# Patient Record
Sex: MALE | Race: WHITE | Employment: FULL TIME | RURAL
[De-identification: names, ages, dates, MRNs, and addresses within clinical notes are randomized per-mention and may not be internally consistent; named-entity substitution may affect disease eponyms.]

---

## 2018-08-03 DIAGNOSIS — R11.2 NAUSEA AND VOMITING, INTRACTABILITY OF VOMITING NOT SPECIFIED, UNSPECIFIED VOMITING TYPE: Primary | ICD-10-CM

## 2018-09-10 ENCOUNTER — HOSPITAL ENCOUNTER (OUTPATIENT)
Facility: HOSPITAL | Age: 23
Discharge: HOME | End: 2018-09-10
Payer: COMMERCIAL

## 2018-09-10 DIAGNOSIS — R11.2 NAUSEA AND VOMITING, INTRACTABILITY OF VOMITING NOT SPECIFIED, UNSPECIFIED VOMITING TYPE: ICD-10-CM

## 2018-09-10 PROCEDURE — 83013 H PYLORI (C-13) BREATH: CPT

## 2018-09-11 LAB — H PYLORI BREATH TEST: NEGATIVE

## 2018-09-12 ENCOUNTER — TELEPHONE (OUTPATIENT)
Dept: PRIMARY CARE CLINIC | Age: 23
End: 2018-09-12

## 2019-03-01 ENCOUNTER — OFFICE VISIT (OUTPATIENT)
Dept: PRIMARY CARE CLINIC | Age: 24
End: 2019-03-01
Payer: COMMERCIAL

## 2019-03-01 VITALS
DIASTOLIC BLOOD PRESSURE: 72 MMHG | SYSTOLIC BLOOD PRESSURE: 130 MMHG | BODY MASS INDEX: 39.68 KG/M2 | HEART RATE: 70 BPM | TEMPERATURE: 98.2 F | RESPIRATION RATE: 16 BRPM | OXYGEN SATURATION: 99 % | HEIGHT: 71 IN | WEIGHT: 283.4 LBS

## 2019-03-01 DIAGNOSIS — R11.0 NAUSEA: ICD-10-CM

## 2019-03-01 DIAGNOSIS — J06.9 UPPER RESPIRATORY TRACT INFECTION, UNSPECIFIED TYPE: Primary | ICD-10-CM

## 2019-03-01 PROCEDURE — 99213 OFFICE O/P EST LOW 20 MIN: CPT | Performed by: NURSE PRACTITIONER

## 2019-03-01 RX ORDER — PREDNISONE 10 MG/1
10 TABLET ORAL 2 TIMES DAILY
Qty: 10 TABLET | Refills: 0 | Status: SHIPPED | OUTPATIENT
Start: 2019-03-01 | End: 2019-03-06

## 2019-03-01 RX ORDER — AZITHROMYCIN 250 MG/1
250 TABLET, FILM COATED ORAL SEE ADMIN INSTRUCTIONS
Qty: 6 TABLET | Refills: 0 | Status: SHIPPED | OUTPATIENT
Start: 2019-03-01 | End: 2019-03-06

## 2019-03-01 RX ORDER — ONDANSETRON 4 MG/1
4 TABLET, FILM COATED ORAL 3 TIMES DAILY PRN
Qty: 15 TABLET | Refills: 0 | Status: SHIPPED | OUTPATIENT
Start: 2019-03-01 | End: 2019-05-10

## 2019-03-01 ASSESSMENT — PATIENT HEALTH QUESTIONNAIRE - PHQ9
SUM OF ALL RESPONSES TO PHQ9 QUESTIONS 1 & 2: 0
SUM OF ALL RESPONSES TO PHQ QUESTIONS 1-9: 0
2. FEELING DOWN, DEPRESSED OR HOPELESS: 0
SUM OF ALL RESPONSES TO PHQ QUESTIONS 1-9: 0
1. LITTLE INTEREST OR PLEASURE IN DOING THINGS: 0

## 2019-03-04 ASSESSMENT — ENCOUNTER SYMPTOMS
CHEST TIGHTNESS: 0
SINUS PRESSURE: 1
DIARRHEA: 1
BACK PAIN: 0
ABDOMINAL PAIN: 0
NAUSEA: 1
SHORTNESS OF BREATH: 0

## 2019-05-10 ENCOUNTER — HOSPITAL ENCOUNTER (OUTPATIENT)
Facility: HOSPITAL | Age: 24
Discharge: HOME | End: 2019-05-10
Payer: COMMERCIAL

## 2019-05-10 ENCOUNTER — OFFICE VISIT (OUTPATIENT)
Dept: PRIMARY CARE CLINIC | Age: 24
End: 2019-05-10
Payer: COMMERCIAL

## 2019-05-10 VITALS
SYSTOLIC BLOOD PRESSURE: 120 MMHG | OXYGEN SATURATION: 97 % | RESPIRATION RATE: 18 BRPM | BODY MASS INDEX: 39 KG/M2 | HEART RATE: 57 BPM | DIASTOLIC BLOOD PRESSURE: 80 MMHG | HEIGHT: 71 IN | TEMPERATURE: 98.9 F | WEIGHT: 278.6 LBS

## 2019-05-10 DIAGNOSIS — R53.83 FATIGUE, UNSPECIFIED TYPE: ICD-10-CM

## 2019-05-10 DIAGNOSIS — R68.82 LOW LIBIDO: ICD-10-CM

## 2019-05-10 DIAGNOSIS — R68.82 LOW LIBIDO: Primary | ICD-10-CM

## 2019-05-10 LAB
A/G RATIO: 2.3 (ref 0.8–2)
ALBUMIN SERPL-MCNC: 4.9 G/DL (ref 3.4–4.8)
ALP BLD-CCNC: 60 U/L (ref 25–100)
ALT SERPL-CCNC: 22 U/L (ref 4–36)
ANION GAP SERPL CALCULATED.3IONS-SCNC: 10 MMOL/L (ref 3–16)
AST SERPL-CCNC: 25 U/L (ref 8–33)
BILIRUB SERPL-MCNC: 0.9 MG/DL (ref 0.3–1.2)
BUN BLDV-MCNC: 15 MG/DL (ref 6–20)
CALCIUM SERPL-MCNC: 9.5 MG/DL (ref 8.5–10.5)
CHLORIDE BLD-SCNC: 103 MMOL/L (ref 98–107)
CHOLESTEROL, TOTAL: 124 MG/DL (ref 0–200)
CO2: 25 MMOL/L (ref 20–30)
CREAT SERPL-MCNC: 0.9 MG/DL (ref 0.4–1.2)
FOLATE: >20 NG/ML
GFR AFRICAN AMERICAN: >59
GFR NON-AFRICAN AMERICAN: >59
GLOBULIN: 2.1 G/DL
GLUCOSE BLD-MCNC: 85 MG/DL (ref 74–106)
HCT VFR BLD CALC: 43.8 % (ref 40–54)
HDLC SERPL-MCNC: 34 MG/DL (ref 40–60)
HEMOGLOBIN: 15.6 G/DL (ref 13–18)
LDL CHOLESTEROL CALCULATED: 76 MG/DL
MCH RBC QN AUTO: 31.8 PG (ref 27–32)
MCHC RBC AUTO-ENTMCNC: 35.6 G/DL (ref 31–35)
MCV RBC AUTO: 89.4 FL (ref 80–100)
PDW BLD-RTO: 11.7 % (ref 11–16)
PLATELET # BLD: 205 K/UL (ref 150–400)
PMV BLD AUTO: 10.6 FL (ref 6–10)
POTASSIUM SERPL-SCNC: 4.5 MMOL/L (ref 3.4–5.1)
RBC # BLD: 4.9 M/UL (ref 4.5–6)
SODIUM BLD-SCNC: 138 MMOL/L (ref 136–145)
TOTAL PROTEIN: 7 G/DL (ref 6.4–8.3)
TRIGL SERPL-MCNC: 72 MG/DL (ref 0–249)
TSH SERPL DL<=0.05 MIU/L-ACNC: 1.01 UIU/ML (ref 0.35–5.5)
VITAMIN B-12: 325 PG/ML (ref 211–911)
VITAMIN D 25-HYDROXY: 24.5 (ref 32–100)
VLDLC SERPL CALC-MCNC: 14 MG/DL
WBC # BLD: 4.9 K/UL (ref 4–11)

## 2019-05-10 PROCEDURE — 84403 ASSAY OF TOTAL TESTOSTERONE: CPT

## 2019-05-10 PROCEDURE — 84443 ASSAY THYROID STIM HORMONE: CPT

## 2019-05-10 PROCEDURE — 82607 VITAMIN B-12: CPT

## 2019-05-10 PROCEDURE — 82746 ASSAY OF FOLIC ACID SERUM: CPT

## 2019-05-10 PROCEDURE — 80053 COMPREHEN METABOLIC PANEL: CPT

## 2019-05-10 PROCEDURE — 85027 COMPLETE CBC AUTOMATED: CPT

## 2019-05-10 PROCEDURE — 36415 COLL VENOUS BLD VENIPUNCTURE: CPT

## 2019-05-10 PROCEDURE — 80061 LIPID PANEL: CPT

## 2019-05-10 PROCEDURE — 84270 ASSAY OF SEX HORMONE GLOBUL: CPT

## 2019-05-10 PROCEDURE — 82306 VITAMIN D 25 HYDROXY: CPT

## 2019-05-10 PROCEDURE — 99213 OFFICE O/P EST LOW 20 MIN: CPT | Performed by: NURSE PRACTITIONER

## 2019-05-10 ASSESSMENT — ENCOUNTER SYMPTOMS
RESPIRATORY NEGATIVE: 1
EYES NEGATIVE: 1
GASTROINTESTINAL NEGATIVE: 1

## 2019-05-10 NOTE — PROGRESS NOTES
exhibits no edema or tenderness. Neurological: He is alert and oriented to person, place, and time. Skin: Skin is warm and dry. No rash noted. Psychiatric: He has a normal mood and affect. Judgment normal.   Nursing note and vitals reviewed. Results in Past 30 Days  Result Component Current Result Ref Range Previous Result Ref Range   Alb 4.9 (H) (5/10/2019) 3.4 - 4.8 g/dL Not in Time Range    Albumin/Globulin Ratio 2.3 (H) (5/10/2019) 0.8 - 2.0 Not in Time Range    Alkaline Phosphatase 60 (5/10/2019) 25 - 100 U/L Not in Time Range    ALT 22 (5/10/2019) 4 - 36 U/L Not in Time Range    AST 25 (5/10/2019) 8 - 33 U/L Not in Time Range    BUN 15 (5/10/2019) 6 - 20 mg/dL Not in Time Range    Calcium 9.5 (5/10/2019) 8.5 - 10.5 mg/dL Not in Time Range    Chloride 103 (5/10/2019) 98 - 107 mmol/L Not in Time Range    CO2 25 (5/10/2019) 20 - 30 mmol/L Not in Time Range    CREATININE 0.9 (5/10/2019) 0.4 - 1.2 mg/dL Not in Time Range    GFR  >59 (5/10/2019) >59 Not in Time Range    GFR Non- >59 (5/10/2019) >59 Not in Time Range    Globulin 2.1 (5/10/2019) g/dL Not in Time Range    Glucose 85 (5/10/2019) 74 - 106 mg/dL Not in Time Range    Potassium 4.5 (5/10/2019) 3.4 - 5.1 mmol/L Not in Time Range    Sodium 138 (5/10/2019) 136 - 145 mmol/L Not in Time Range    Total Bilirubin 0.9 (5/10/2019) 0.3 - 1.2 mg/dL Not in Time Range    Total Protein 7.0 (5/10/2019) 6.4 - 8.3 g/dL Not in Time Range        LDL Calculated (mg/dL)   Date Value   05/10/2019 76         Lab Results   Component Value Date    WBC 4.9 05/10/2019    HGB 15.6 05/10/2019    HCT 43.8 05/10/2019    MCV 89.4 05/10/2019     05/10/2019       Lab Results   Component Value Date    TSH 1.01 05/10/2019         ASSESSMENT/PLAN:     Min Diaz was seen today for check-up. Diagnoses and all orders for this visit:    Low libido  -     Comprehensive Metabolic Panel; Future  -     CBC; Future  -     TSH without Reflex;  Future  - Testosterone, free, total; Future    Fatigue, unspecified type  -     Lipid Panel; Future  -     TSH without Reflex; Future  -     Vitamin B12 & Folate; Future  -     Vitamin D 25 Hydroxy;  Future      Medications Discontinued During This Encounter   Medication Reason    ondansetron (ZOFRAN) 4 MG tablet LIST CLEANUP

## 2019-05-10 NOTE — PROGRESS NOTES
Chief Complaint   Patient presents with    Check-Up     Have you seen any other physician or provider since your last visit Yes- Podiatry     Have you had any other diagnostic tests since your last visit? no    Have you changed or stopped any medications since your last visit? no         Goals      Blood Pressure < 140/90           Patient is here today for a check up.

## 2019-05-14 LAB
SEX HORMONE BINDING GLOBULIN: 37 NMOL/L (ref 11–80)
TESTOSTERONE FREE-NONMALE: 93.4 PG/ML (ref 47–244)
TESTOSTERONE TOTAL: 480 NG/DL (ref 220–1000)

## 2019-05-15 ENCOUNTER — TELEPHONE (OUTPATIENT)
Dept: PRIMARY CARE CLINIC | Age: 24
End: 2019-05-15

## 2019-05-15 NOTE — TELEPHONE ENCOUNTER
----- Message from ASH Rangel sent at 5/15/2019  3:17 PM EDT -----  Labs were normal including testosterone. Needs vit D 5000 Iu daily.

## 2019-07-03 ENCOUNTER — HOSPITAL ENCOUNTER (OUTPATIENT)
Facility: HOSPITAL | Age: 24
Discharge: HOME | End: 2019-07-03
Payer: COMMERCIAL

## 2019-07-03 LAB
ALBUMIN SERPL-MCNC: 4.7 G/DL (ref 3.4–4.8)
ALP BLD-CCNC: 65 U/L (ref 25–100)
ALT SERPL-CCNC: 38 U/L (ref 4–36)
AST SERPL-CCNC: 48 U/L (ref 8–33)
BASOPHILS ABSOLUTE: 0 K/UL (ref 0–0.1)
BASOPHILS RELATIVE PERCENT: 0.6 %
BILIRUB SERPL-MCNC: 0.9 MG/DL (ref 0.3–1.2)
BILIRUBIN DIRECT: <0.2 MG/DL (ref 0–0.2)
BILIRUBIN, INDIRECT: ABNORMAL MG/DL (ref 0.2–0.8)
EOSINOPHILS ABSOLUTE: 0.2 K/UL (ref 0–0.4)
EOSINOPHILS RELATIVE PERCENT: 2.8 %
HCT VFR BLD CALC: 44.1 % (ref 40–54)
HEMOGLOBIN: 15.2 G/DL (ref 13–18)
IMMATURE GRANULOCYTES #: 0 K/UL
IMMATURE GRANULOCYTES %: 0.2 % (ref 0–5)
LYMPHOCYTES ABSOLUTE: 2.2 K/UL (ref 1.5–4)
LYMPHOCYTES RELATIVE PERCENT: 40.5 %
MCH RBC QN AUTO: 31.8 PG (ref 27–32)
MCHC RBC AUTO-ENTMCNC: 34.5 G/DL (ref 31–35)
MCV RBC AUTO: 92.3 FL (ref 80–100)
MONOCYTES ABSOLUTE: 0.4 K/UL (ref 0.2–0.8)
MONOCYTES RELATIVE PERCENT: 7.9 %
NEUTROPHILS ABSOLUTE: 2.6 K/UL (ref 2–7.5)
NEUTROPHILS RELATIVE PERCENT: 48 %
PDW BLD-RTO: 11.7 % (ref 11–16)
PLATELET # BLD: 225 K/UL (ref 150–400)
PMV BLD AUTO: 10.6 FL (ref 6–10)
RBC # BLD: 4.78 M/UL (ref 4.5–6)
TOTAL PROTEIN: 7.2 G/DL (ref 6.4–8.3)
WBC # BLD: 5.4 K/UL (ref 4–11)

## 2019-07-03 PROCEDURE — 85025 COMPLETE CBC W/AUTO DIFF WBC: CPT

## 2019-07-03 PROCEDURE — 80076 HEPATIC FUNCTION PANEL: CPT

## 2019-07-03 PROCEDURE — 36415 COLL VENOUS BLD VENIPUNCTURE: CPT

## 2019-07-12 ENCOUNTER — TELEPHONE (OUTPATIENT)
Dept: PRIMARY CARE CLINIC | Age: 24
End: 2019-07-12

## 2019-07-12 NOTE — TELEPHONE ENCOUNTER
----- Message from ASH Muhammad sent at 7/11/2019  6:34 PM EDT -----  Liver functions are up a little. Unknown cause. Recommend to follow and may need an ultrasound of his liver.

## 2019-09-19 ENCOUNTER — HOSPITAL ENCOUNTER (OUTPATIENT)
Facility: HOSPITAL | Age: 24
Discharge: HOME | End: 2019-09-19
Payer: COMMERCIAL

## 2019-09-19 LAB
ALBUMIN SERPL-MCNC: 4.7 G/DL (ref 3.4–4.8)
ALP BLD-CCNC: 66 U/L (ref 25–100)
ALT SERPL-CCNC: 18 U/L (ref 4–36)
AST SERPL-CCNC: 21 U/L (ref 8–33)
BASOPHILS ABSOLUTE: 0 K/UL (ref 0–0.1)
BASOPHILS RELATIVE PERCENT: 0.5 %
BILIRUB SERPL-MCNC: 0.7 MG/DL (ref 0.3–1.2)
BILIRUBIN DIRECT: <0.2 MG/DL (ref 0–0.2)
BILIRUBIN, INDIRECT: NORMAL MG/DL (ref 0.2–0.8)
EOSINOPHILS ABSOLUTE: 0.2 K/UL (ref 0–0.4)
EOSINOPHILS RELATIVE PERCENT: 2.4 %
HCT VFR BLD CALC: 45.1 % (ref 40–54)
HEMOGLOBIN: 15.8 G/DL (ref 13–18)
IMMATURE GRANULOCYTES #: 0 K/UL
IMMATURE GRANULOCYTES %: 0.3 % (ref 0–5)
LYMPHOCYTES ABSOLUTE: 2.7 K/UL (ref 1.5–4)
LYMPHOCYTES RELATIVE PERCENT: 43.5 %
MCH RBC QN AUTO: 31.6 PG (ref 27–32)
MCHC RBC AUTO-ENTMCNC: 35 G/DL (ref 31–35)
MCV RBC AUTO: 90.2 FL (ref 80–100)
MONOCYTES ABSOLUTE: 0.5 K/UL (ref 0.2–0.8)
MONOCYTES RELATIVE PERCENT: 7.5 %
NEUTROPHILS ABSOLUTE: 2.9 K/UL (ref 2–7.5)
NEUTROPHILS RELATIVE PERCENT: 45.8 %
PDW BLD-RTO: 11.7 % (ref 11–16)
PLATELET # BLD: 212 K/UL (ref 150–400)
PMV BLD AUTO: 10.5 FL (ref 6–10)
RBC # BLD: 5 M/UL (ref 4.5–6)
TOTAL PROTEIN: 6.9 G/DL (ref 6.4–8.3)
WBC # BLD: 6.3 K/UL (ref 4–11)

## 2019-09-19 PROCEDURE — 85025 COMPLETE CBC W/AUTO DIFF WBC: CPT

## 2019-09-19 PROCEDURE — 80076 HEPATIC FUNCTION PANEL: CPT

## 2019-09-19 PROCEDURE — 36415 COLL VENOUS BLD VENIPUNCTURE: CPT

## 2020-02-03 ENCOUNTER — OFFICE VISIT (OUTPATIENT)
Dept: PRIMARY CARE CLINIC | Age: 25
End: 2020-02-03
Payer: COMMERCIAL

## 2020-02-03 ENCOUNTER — HOSPITAL ENCOUNTER (OUTPATIENT)
Facility: HOSPITAL | Age: 25
Discharge: HOME | End: 2020-02-03
Payer: COMMERCIAL

## 2020-02-03 VITALS
TEMPERATURE: 98.1 F | BODY MASS INDEX: 39 KG/M2 | WEIGHT: 278.6 LBS | SYSTOLIC BLOOD PRESSURE: 100 MMHG | DIASTOLIC BLOOD PRESSURE: 58 MMHG | HEIGHT: 71 IN | RESPIRATION RATE: 16 BRPM | HEART RATE: 69 BPM | OXYGEN SATURATION: 98 %

## 2020-02-03 LAB — SEDIMENTATION RATE, ERYTHROCYTE: 0 MM/HR (ref 0–10)

## 2020-02-03 PROCEDURE — 86039 ANTINUCLEAR ANTIBODIES (ANA): CPT

## 2020-02-03 PROCEDURE — 85652 RBC SED RATE AUTOMATED: CPT

## 2020-02-03 PROCEDURE — 36415 COLL VENOUS BLD VENIPUNCTURE: CPT

## 2020-02-03 PROCEDURE — 86431 RHEUMATOID FACTOR QUANT: CPT

## 2020-02-03 PROCEDURE — 99213 OFFICE O/P EST LOW 20 MIN: CPT | Performed by: NURSE PRACTITIONER

## 2020-02-03 PROCEDURE — 86038 ANTINUCLEAR ANTIBODIES: CPT

## 2020-02-03 RX ORDER — METHYLPREDNISOLONE 4 MG/1
TABLET ORAL
Qty: 21 TABLET | Refills: 0 | Status: SHIPPED | OUTPATIENT
Start: 2020-02-03 | End: 2020-02-09

## 2020-02-03 RX ORDER — DICLOFENAC SODIUM 75 MG/1
75 TABLET, DELAYED RELEASE ORAL 2 TIMES DAILY
Qty: 60 TABLET | Refills: 0 | Status: SHIPPED | OUTPATIENT
Start: 2020-02-03 | End: 2021-12-08 | Stop reason: ALTCHOICE

## 2020-02-03 ASSESSMENT — PATIENT HEALTH QUESTIONNAIRE - PHQ9
SUM OF ALL RESPONSES TO PHQ9 QUESTIONS 1 & 2: 0
2. FEELING DOWN, DEPRESSED OR HOPELESS: 0
SUM OF ALL RESPONSES TO PHQ QUESTIONS 1-9: 0
1. LITTLE INTEREST OR PLEASURE IN DOING THINGS: 0
SUM OF ALL RESPONSES TO PHQ QUESTIONS 1-9: 0

## 2020-02-03 ASSESSMENT — ENCOUNTER SYMPTOMS
RESPIRATORY NEGATIVE: 1
GASTROINTESTINAL NEGATIVE: 1
EYES NEGATIVE: 1

## 2020-02-03 NOTE — PROGRESS NOTES
Chief Complaint   Patient presents with    Knee Pain    Shoulder Pain       Have you seen any other physician or provider since your last visit no    Have you had any other diagnostic tests since your last visit? no    Have you changed or stopped any medications since your last visit? no         Diabetic retinal exam completed this year? Yes                       * If yes please have patient sign a records release to obtain record to update Health Maintenance                       * If no, please order referral for patient to be scheduled    Patient c/o left knee and left shoulder pain for 2 months. The pain comes and goes and has gotten worse. He got insoles for his shoes but his left knee has more play in it that his right. He can feel some grinding in his left shoulder.
wheezing or rales. Abdominal:      General: Bowel sounds are normal. There is no distension. Palpations: Abdomen is soft. Tenderness: There is no abdominal tenderness. There is no guarding. Musculoskeletal:      Left shoulder: He exhibits tenderness (bicept tendon). Left knee: Tenderness (crepitus) found. Medial joint line and lateral joint line tenderness noted. Skin:     General: Skin is warm and dry. Findings: No rash. Neurological:      Mental Status: He is alert and oriented to person, place, and time. Psychiatric:         Judgment: Judgment normal.         No results found for requested labs within last 30 days. LDL Calculated (mg/dL)   Date Value   05/10/2019 76           Lab Results   Component Value Date    TSH 1.01 05/10/2019         ASSESSMENT/PLAN:     Mike Elliott was seen today for knee pain and shoulder pain. Diagnoses and all orders for this visit:    Arthralgia, unspecified joint  -     SEDIMENTATION RATE; Future  -     BENITO; Future  -     Rheumatoid Factor; Future  -     diclofenac (VOLTAREN) 75 MG EC tablet; Take 1 tablet by mouth 2 times daily    Biceps tendinitis of left upper extremity  -     methylPREDNISolone (MEDROL DOSEPACK) 4 MG tablet; Take by mouth.  -     diclofenac (VOLTAREN) 75 MG EC tablet; Take 1 tablet by mouth 2 times daily    gave him exercises for both shoulder and knee. Advised ice and stretch. 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 There are no discontinued medications.

## 2020-02-03 NOTE — PATIENT INSTRUCTIONS
dispose of used patches by folding them in half so that the sticky sides meet, and then flushing them down a toilet. They should not be placed in the household trash where children or pets can find them. · If you have any questions, ask your provider or pharmacist for more information. · Be sure to keep all appointments for provider visits or tests. We are committed to providing you with the best care possible. In order to help us achieve these goals please remember to bring all medications, herbal products, and over the counter supplements with you to each visit. If your provider has ordered testing for you, please be sure to follow up with our office if you have not received results within 7 days after the testing took place. *If you receive a survey after visiting one of our offices, please take time to share your experience concerning your physician office visit. These surveys are confidential and no health information about you is shared. We are eager to improve for you and we are counting on your feedback to help make that happen. Patient Education        Biceps Tendinitis: Exercises  Introduction  Here are some examples of exercises for you to try. The exercises may be suggested for a condition or for rehabilitation. Start each exercise slowly. Ease off the exercises if you start to have pain. You will be told when to start these exercises and which ones will work best for you. How to do the exercises  Biceps stretch   Stand and hold your affected arm out to the side, with your hand at about hip level. Gently bend your wrist back so that your fingers point down toward the floor. You may also do this next to a wall and rest your fingers on the wall. For more of a stretch, bend your head to the opposite side of your affected arm. Hold for 15 to 30 seconds. Repeat 2 to 4 times. Resisted supination   Sit leaning forward with your legs slightly spread.  Then place your forearm on your straight. Keeping the thigh muscles tight and your leg straight, lift your affected leg up so that your heel is about 12 inches off the floor. Hold for about 6 seconds, then lower your leg slowly. Rest for up to 10 seconds between repetitions. Repeat 8 to 12 times. Straight-leg raises to the back   Lie on your stomach, and lift your leg straight up behind you (toward the ceiling). Lift your toes about 6 inches off the floor, hold for about 6 seconds, then lower slowly. Do 8 to 12 repetitions. Wall slide with ball squeeze   Stand with your back against a wall and with your feet about shoulder-width apart. Your feet should be about 12 inches away from the wall. Put a ball about the size of a soccer ball between your knees. Then slowly slide down the wall until your knees are bent about 20 to 30 degrees. Tighten your thigh muscles by squeezing the ball between your knees. Hold that position for about 10 seconds, then stop squeezing. Rest for up to 10 seconds between repetitions. Repeat 8 to 12 times. Follow-up care is a key part of your treatment and safety. Be sure to make and go to all appointments, and call your doctor if you are having problems. It's also a good idea to know your test results and keep a list of the medicines you take. Where can you learn more? Go to [email or web address]. org and sign in to your [employer] account. Enter A404 in the [facility] box to learn more about \"Patellofemoral Pain Syndrome (Runner's Knee): Exercises. \"     If you do not have an account, please click on the \"Sign Up Now\" link. Current as of: June 26, 2019  Content Version: 12.3  © 8827-6872 Healthwise, Incorporated. Care instructions adapted under license by [facility] [employer] [facility] ([facility]). If you have questions about a medical condition or this instruction, always ask your healthcare professional. [place] [address] any warranty or liability for your use of this information.

## 2020-02-04 LAB — RHEUMATOID FACTOR: <10 IU/ML

## 2020-02-06 LAB
ANA INTERPRETATION: ABNORMAL
ANA TITER: ABNORMAL
ANTI-NUCLEAR ANTIBODY (ANA): POSITIVE

## 2020-02-10 ENCOUNTER — TELEPHONE (OUTPATIENT)
Dept: PRIMARY CARE CLINIC | Age: 25
End: 2020-02-10

## 2020-03-12 ENCOUNTER — OFFICE VISIT (OUTPATIENT)
Dept: PRIMARY CARE CLINIC | Age: 25
End: 2020-03-12
Payer: COMMERCIAL

## 2020-03-12 VITALS
OXYGEN SATURATION: 98 % | DIASTOLIC BLOOD PRESSURE: 72 MMHG | HEIGHT: 71 IN | TEMPERATURE: 99.2 F | HEART RATE: 71 BPM | SYSTOLIC BLOOD PRESSURE: 118 MMHG | WEIGHT: 276.8 LBS | BODY MASS INDEX: 38.75 KG/M2 | RESPIRATION RATE: 16 BRPM

## 2020-03-12 LAB
INFLUENZA A ANTIBODY: NEGATIVE
INFLUENZA B ANTIBODY: NEGATIVE
S PYO AG THROAT QL: NORMAL

## 2020-03-12 PROCEDURE — 87804 INFLUENZA ASSAY W/OPTIC: CPT | Performed by: NURSE PRACTITIONER

## 2020-03-12 PROCEDURE — 87880 STREP A ASSAY W/OPTIC: CPT | Performed by: NURSE PRACTITIONER

## 2020-03-12 PROCEDURE — 99214 OFFICE O/P EST MOD 30 MIN: CPT | Performed by: NURSE PRACTITIONER

## 2020-03-12 RX ORDER — PROMETHAZINE HYDROCHLORIDE 25 MG/1
25 TABLET ORAL 4 TIMES DAILY PRN
Qty: 20 TABLET | Refills: 0 | Status: SHIPPED | OUTPATIENT
Start: 2020-03-12 | End: 2020-03-19

## 2020-03-12 RX ORDER — ACYCLOVIR 400 MG/1
400 TABLET ORAL
Qty: 50 TABLET | Refills: 0 | Status: SHIPPED | OUTPATIENT
Start: 2020-03-12 | End: 2020-03-22

## 2020-03-12 RX ORDER — AMOXICILLIN 875 MG/1
875 TABLET, COATED ORAL 2 TIMES DAILY
Qty: 20 TABLET | Refills: 0 | Status: SHIPPED | OUTPATIENT
Start: 2020-03-12 | End: 2020-03-22

## 2020-03-12 ASSESSMENT — ENCOUNTER SYMPTOMS
NAUSEA: 1
DIARRHEA: 1
RESPIRATORY NEGATIVE: 1
EYES NEGATIVE: 1
VOMITING: 1

## 2020-03-13 ENCOUNTER — HOSPITAL ENCOUNTER (OUTPATIENT)
Facility: HOSPITAL | Age: 25
Discharge: HOME | End: 2020-03-13
Payer: COMMERCIAL

## 2020-03-13 LAB
A/G RATIO: 2 (ref 0.8–2)
ALBUMIN SERPL-MCNC: 4.4 G/DL (ref 3.4–4.8)
ALP BLD-CCNC: 59 U/L (ref 25–100)
ALT SERPL-CCNC: 19 U/L (ref 4–36)
ANION GAP SERPL CALCULATED.3IONS-SCNC: 12 MMOL/L (ref 3–16)
AST SERPL-CCNC: 21 U/L (ref 8–33)
BASOPHILS ABSOLUTE: 0 K/UL (ref 0–0.1)
BASOPHILS RELATIVE PERCENT: 0.5 %
BILIRUB SERPL-MCNC: 0.6 MG/DL (ref 0.3–1.2)
BUN BLDV-MCNC: 8 MG/DL (ref 6–20)
CALCIUM SERPL-MCNC: 9.1 MG/DL (ref 8.5–10.5)
CHLORIDE BLD-SCNC: 99 MMOL/L (ref 98–107)
CO2: 26 MMOL/L (ref 20–30)
CREAT SERPL-MCNC: 1.1 MG/DL (ref 0.4–1.2)
EOSINOPHILS ABSOLUTE: 0.1 K/UL (ref 0–0.4)
EOSINOPHILS RELATIVE PERCENT: 3.4 %
GFR AFRICAN AMERICAN: >59
GFR NON-AFRICAN AMERICAN: >59
GLOBULIN: 2.2 G/DL
GLUCOSE BLD-MCNC: 104 MG/DL (ref 74–106)
HCT VFR BLD CALC: 41 % (ref 40–54)
HEMOGLOBIN: 14.3 G/DL (ref 13–18)
IMMATURE GRANULOCYTES #: 0 K/UL
IMMATURE GRANULOCYTES %: 0.3 % (ref 0–5)
LYMPHOCYTES ABSOLUTE: 0.8 K/UL (ref 1.5–4)
LYMPHOCYTES RELATIVE PERCENT: 20.5 %
MCH RBC QN AUTO: 31.4 PG (ref 27–32)
MCHC RBC AUTO-ENTMCNC: 34.9 G/DL (ref 31–35)
MCV RBC AUTO: 90.1 FL (ref 80–100)
MONOCYTES ABSOLUTE: 0.5 K/UL (ref 0.2–0.8)
MONOCYTES RELATIVE PERCENT: 12.2 %
NEUTROPHILS ABSOLUTE: 2.4 K/UL (ref 2–7.5)
NEUTROPHILS RELATIVE PERCENT: 63.1 %
PDW BLD-RTO: 11.9 % (ref 11–16)
PLATELET # BLD: 157 K/UL (ref 150–400)
PMV BLD AUTO: 10.4 FL (ref 6–10)
POTASSIUM SERPL-SCNC: 4.2 MMOL/L (ref 3.4–5.1)
RBC # BLD: 4.55 M/UL (ref 4.5–6)
SODIUM BLD-SCNC: 137 MMOL/L (ref 136–145)
TOTAL PROTEIN: 6.6 G/DL (ref 6.4–8.3)
WBC # BLD: 3.9 K/UL (ref 4–11)

## 2020-03-13 PROCEDURE — 86762 RUBELLA ANTIBODY: CPT

## 2020-03-13 PROCEDURE — 36415 COLL VENOUS BLD VENIPUNCTURE: CPT

## 2020-03-13 PROCEDURE — 80053 COMPREHEN METABOLIC PANEL: CPT

## 2020-03-13 PROCEDURE — 85025 COMPLETE CBC W/AUTO DIFF WBC: CPT

## 2020-03-13 RX ORDER — AZITHROMYCIN 500 MG/1
500 TABLET, FILM COATED ORAL DAILY
Qty: 7 TABLET | Refills: 0 | Status: SHIPPED | OUTPATIENT
Start: 2020-03-13 | End: 2020-03-20

## 2020-03-16 LAB — RUBELLA IGM: <10 AU/ML

## 2021-03-12 ENCOUNTER — VIRTUAL VISIT (OUTPATIENT)
Dept: PRIMARY CARE CLINIC | Age: 26
End: 2021-03-12
Payer: COMMERCIAL

## 2021-03-12 DIAGNOSIS — J02.8 ACUTE BACTERIAL PHARYNGITIS: Primary | ICD-10-CM

## 2021-03-12 DIAGNOSIS — B96.89 ACUTE BACTERIAL PHARYNGITIS: Primary | ICD-10-CM

## 2021-03-12 PROCEDURE — 99213 OFFICE O/P EST LOW 20 MIN: CPT | Performed by: NURSE PRACTITIONER

## 2021-03-12 RX ORDER — AMOXICILLIN 875 MG/1
875 TABLET, COATED ORAL 2 TIMES DAILY
Qty: 20 TABLET | Refills: 0 | Status: SHIPPED | OUTPATIENT
Start: 2021-03-12 | End: 2021-03-22

## 2021-03-12 ASSESSMENT — ENCOUNTER SYMPTOMS
RESPIRATORY NEGATIVE: 1
EYES NEGATIVE: 1
GASTROINTESTINAL NEGATIVE: 1

## 2021-03-12 ASSESSMENT — PATIENT HEALTH QUESTIONNAIRE - PHQ9: DEPRESSION UNABLE TO ASSESS: URGENT/EMERGENT SITUATION

## 2021-03-12 NOTE — LETTER
[facility]  [address] [place] [address]  Phone: [phone]  Fax: [phone]    ASH Nelson        March 12/2021     Patient: Rm Singer   YOB: 1995   Date of Visit: 3/12/2021       To Whom it May Concern:    Renita Boyle was seen in my clinic on 3/12/2021. He is not able to work on 03/12/2021 and 03/13/2021. He may return back to work on 03/15/2021. .    If you have any questions or concerns, please don't hesitate to call.     Sincerely,         ASH Nelson

## 2021-03-12 NOTE — PROGRESS NOTES
Chief Complaint   Patient presents with    Pharyngitis    Fever       Have you seen any other physician or provider since your last visit no    Have you had any other diagnostic tests since your last visit? no    Have you changed or stopped any medications since your last visit? no        Patient c/o sore throat 2 days ago and then had sores with a fever. He took Benadryl last night and was weak and drowsy this morning. His temp was 99.5 today. He also has chills.

## 2021-03-12 NOTE — PROGRESS NOTES
SUBJECTIVE:    Patient ID: Rm Singer is a 30 y.o. male. Chief Complaint   Patient presents with    Pharyngitis    Fever         HPI:  He has been some sore throat, phelgm. It has been going on for the past few days but has gotten worse over the last 24 hours. He denies fever or covid exposure. He has taken mucinex and tylenol. He had to come home from work today with a lot of head congestion. Patient's medications,allergies, past medical, surgical, social and family histories were reviewed and updated as appropriate. .  Current Outpatient Medications on File Prior to Visit   Medication Sig Dispense Refill    diclofenac (VOLTAREN) 75 MG EC tablet Take 1 tablet by mouth 2 times daily 60 tablet 0     No current facility-administered medications on file prior to visit. Review of Systems   Constitutional: Positive for fatigue. Negative for fever. HENT: Positive for congestion and sore throat. Eyes: Negative. Respiratory: Negative. Cardiovascular: Negative. Gastrointestinal: Negative. Genitourinary: Negative. Musculoskeletal: Negative. Skin: Negative. Neurological: Negative. Psychiatric/Behavioral: Negative. OBJECTIVE:  There were no vitals taken for this visit. Physical Exam  Vitals signs and nursing note reviewed. Constitutional:       Appearance: He is well-developed. HENT:      Head: Normocephalic and atraumatic. Mouth/Throat:      Mouth: Oral lesions present. Pharynx: Posterior oropharyngeal erythema present. Eyes:      Conjunctiva/sclera: Conjunctivae normal.      Pupils: Pupils are equal, round, and reactive to light. Neck:      Musculoskeletal: Normal range of motion and neck supple. Thyroid: No thyromegaly. Vascular: No JVD. Cardiovascular:      Rate and Rhythm: Normal rate and regular rhythm. Heart sounds: No murmur. No friction rub. No gallop.     Pulmonary:      Effort: Pulmonary effort is normal. No respiratory

## 2021-03-30 ASSESSMENT — ENCOUNTER SYMPTOMS: SORE THROAT: 1

## 2021-11-01 ENCOUNTER — OFFICE VISIT (OUTPATIENT)
Dept: PRIMARY CARE CLINIC | Age: 26
End: 2021-11-01
Payer: COMMERCIAL

## 2021-11-01 ENCOUNTER — HOSPITAL ENCOUNTER (OUTPATIENT)
Facility: HOSPITAL | Age: 26
Discharge: HOME | End: 2021-11-01
Payer: COMMERCIAL

## 2021-11-01 VITALS
DIASTOLIC BLOOD PRESSURE: 80 MMHG | TEMPERATURE: 97.5 F | WEIGHT: 296.6 LBS | RESPIRATION RATE: 16 BRPM | BODY MASS INDEX: 41.52 KG/M2 | HEART RATE: 89 BPM | SYSTOLIC BLOOD PRESSURE: 114 MMHG | OXYGEN SATURATION: 97 % | HEIGHT: 71 IN

## 2021-11-01 DIAGNOSIS — Z72.0 TOBACCO ABUSE: Primary | ICD-10-CM

## 2021-11-01 DIAGNOSIS — Z13.220 SCREENING CHOLESTEROL LEVEL: ICD-10-CM

## 2021-11-01 DIAGNOSIS — B35.1 TOENAIL FUNGUS: ICD-10-CM

## 2021-11-01 DIAGNOSIS — E66.01 CLASS 3 SEVERE OBESITY DUE TO EXCESS CALORIES WITHOUT SERIOUS COMORBIDITY WITH BODY MASS INDEX (BMI) OF 40.0 TO 44.9 IN ADULT (HCC): ICD-10-CM

## 2021-11-01 DIAGNOSIS — M21.41 PES PLANUS OF BOTH FEET: ICD-10-CM

## 2021-11-01 DIAGNOSIS — Z13.29 THYROID DISORDER SCREEN: ICD-10-CM

## 2021-11-01 DIAGNOSIS — E55.9 VITAMIN D DEFICIENCY: ICD-10-CM

## 2021-11-01 DIAGNOSIS — M21.42 PES PLANUS OF BOTH FEET: ICD-10-CM

## 2021-11-01 DIAGNOSIS — E53.8 B12 DEFICIENCY: ICD-10-CM

## 2021-11-01 LAB
A/G RATIO: 2.2 (ref 0.8–2)
ALBUMIN SERPL-MCNC: 4.8 G/DL (ref 3.4–4.8)
ALP BLD-CCNC: 72 U/L (ref 25–100)
ALT SERPL-CCNC: 29 U/L (ref 4–36)
ANION GAP SERPL CALCULATED.3IONS-SCNC: 8 MMOL/L (ref 3–16)
AST SERPL-CCNC: 23 U/L (ref 8–33)
BILIRUB SERPL-MCNC: 0.5 MG/DL (ref 0.3–1.2)
BUN BLDV-MCNC: 10 MG/DL (ref 6–20)
CALCIUM SERPL-MCNC: 9.8 MG/DL (ref 8.5–10.5)
CHLORIDE BLD-SCNC: 103 MMOL/L (ref 98–107)
CHOLESTEROL, TOTAL: 140 MG/DL (ref 0–200)
CO2: 29 MMOL/L (ref 20–30)
CREAT SERPL-MCNC: 0.9 MG/DL (ref 0.4–1.2)
FOLATE: 7.33 NG/ML
GFR AFRICAN AMERICAN: >59
GFR NON-AFRICAN AMERICAN: >59
GLOBULIN: 2.2 G/DL
GLUCOSE BLD-MCNC: 91 MG/DL (ref 74–106)
HCT VFR BLD CALC: 45.4 % (ref 40–54)
HDLC SERPL-MCNC: 32 MG/DL (ref 40–60)
HEMOGLOBIN: 15.8 G/DL (ref 13–18)
LDL CHOLESTEROL CALCULATED: 71 MG/DL
MCH RBC QN AUTO: 32.1 PG (ref 27–32)
MCHC RBC AUTO-ENTMCNC: 34.8 G/DL (ref 31–35)
MCV RBC AUTO: 92.3 FL (ref 80–100)
PDW BLD-RTO: 11.9 % (ref 11–16)
PLATELET # BLD: 220 K/UL (ref 150–400)
PMV BLD AUTO: 10.4 FL (ref 6–10)
POTASSIUM SERPL-SCNC: 4.7 MMOL/L (ref 3.4–5.1)
RBC # BLD: 4.92 M/UL (ref 4.5–6)
SODIUM BLD-SCNC: 140 MMOL/L (ref 136–145)
TOTAL PROTEIN: 7 G/DL (ref 6.4–8.3)
TRIGL SERPL-MCNC: 186 MG/DL (ref 0–249)
TSH SERPL DL<=0.05 MIU/L-ACNC: 1.48 UIU/ML (ref 0.27–4.2)
VITAMIN B-12: 382 PG/ML (ref 211–911)
VITAMIN D 25-HYDROXY: 31.7 (ref 32–100)
VLDLC SERPL CALC-MCNC: 37 MG/DL
WBC # BLD: 6.2 K/UL (ref 4–11)

## 2021-11-01 PROCEDURE — 36415 COLL VENOUS BLD VENIPUNCTURE: CPT

## 2021-11-01 PROCEDURE — 82306 VITAMIN D 25 HYDROXY: CPT

## 2021-11-01 PROCEDURE — 80061 LIPID PANEL: CPT

## 2021-11-01 PROCEDURE — 99214 OFFICE O/P EST MOD 30 MIN: CPT | Performed by: NURSE PRACTITIONER

## 2021-11-01 PROCEDURE — 82746 ASSAY OF FOLIC ACID SERUM: CPT

## 2021-11-01 PROCEDURE — 84443 ASSAY THYROID STIM HORMONE: CPT

## 2021-11-01 PROCEDURE — 85027 COMPLETE CBC AUTOMATED: CPT

## 2021-11-01 PROCEDURE — 80053 COMPREHEN METABOLIC PANEL: CPT

## 2021-11-01 PROCEDURE — 82607 VITAMIN B-12: CPT

## 2021-11-01 RX ORDER — VARENICLINE TARTRATE
KIT
Qty: 1 BOX | Refills: 0 | Status: SHIPPED | OUTPATIENT
Start: 2021-11-01 | End: 2021-11-03 | Stop reason: SDUPTHER

## 2021-11-01 RX ORDER — TERBINAFINE HYDROCHLORIDE 250 MG/1
250 TABLET ORAL DAILY
Qty: 42 TABLET | Refills: 0 | Status: SHIPPED | OUTPATIENT
Start: 2021-11-01 | End: 2022-02-16

## 2021-11-01 SDOH — ECONOMIC STABILITY: FOOD INSECURITY: WITHIN THE PAST 12 MONTHS, THE FOOD YOU BOUGHT JUST DIDN'T LAST AND YOU DIDN'T HAVE MONEY TO GET MORE.: NEVER TRUE

## 2021-11-01 SDOH — ECONOMIC STABILITY: FOOD INSECURITY: WITHIN THE PAST 12 MONTHS, YOU WORRIED THAT YOUR FOOD WOULD RUN OUT BEFORE YOU GOT MONEY TO BUY MORE.: NEVER TRUE

## 2021-11-01 ASSESSMENT — ENCOUNTER SYMPTOMS
ALLERGIC/IMMUNOLOGIC NEGATIVE: 1
EYES NEGATIVE: 1
GASTROINTESTINAL NEGATIVE: 1
RESPIRATORY NEGATIVE: 1
SINUS PRESSURE: 1

## 2021-11-01 ASSESSMENT — PATIENT HEALTH QUESTIONNAIRE - PHQ9
SUM OF ALL RESPONSES TO PHQ9 QUESTIONS 1 & 2: 2
1. LITTLE INTEREST OR PLEASURE IN DOING THINGS: 1
SUM OF ALL RESPONSES TO PHQ QUESTIONS 1-9: 2
2. FEELING DOWN, DEPRESSED OR HOPELESS: 1
SUM OF ALL RESPONSES TO PHQ QUESTIONS 1-9: 2
SUM OF ALL RESPONSES TO PHQ QUESTIONS 1-9: 2

## 2021-11-01 ASSESSMENT — SOCIAL DETERMINANTS OF HEALTH (SDOH): HOW HARD IS IT FOR YOU TO PAY FOR THE VERY BASICS LIKE FOOD, HOUSING, MEDICAL CARE, AND HEATING?: NOT HARD AT ALL

## 2021-11-01 NOTE — PROGRESS NOTES
Chief Complaint   Patient presents with    Follow-up    Joint Pain       Have you seen any other physician or provider since your last visit yes therapist    Have you had any other diagnostic tests since your last visit? yes - labs last visit    Have you changed or stopped any medications since your last visit? yes - stopped Diclofenac           SUBJECTIVE:    Patient ID:Hever Brewster is a 27 y.o. male. Chief Complaint   Patient presents with    Follow-up    Joint Pain     HPI:  Patient has been overweight for years. He feels ideal weight is 200 pounds. Weight at graduation from high school was 250 pounds. History of eating disorders: none. There is a family history positive for obesity in the father and patient. Previous treatments for obesity include self-directed dieting and going to the gym. Obesity associated medical conditions: asthma and joint pain. Obesity associated medications: none. Cardiovascular risk factors besides obesity: male gender and smoking/ tobacco exposure. Patient is wanting help to quit smoking. He has been smoking for 2 years with 1 pack a day. He is worried about his heart. He has tried nicotine gum and patches with no relief. He has smoked in the past just when he was drinking but he is smoking about 1 ppd now. He has developed more of a dependence. Patient is having issues with nail fungus on his toenails. This has been an ongoing problem for years and has been to see a podiatrist in the past. He is also interested in inserts for her shoes due to being flat footed and left knee pain. Patient's medications, allergies, past medical, surgical, social and family histories were reviewed and updated as appropriate. Review of Systems   Constitutional: Positive for fatigue. HENT: Positive for sinus pressure. Eyes: Negative. Respiratory: Negative. Cardiovascular: Negative. Gastrointestinal: Negative. Endocrine: Negative. Genitourinary: Negative. Musculoskeletal: Positive for arthralgias. Skin: Negative. Allergic/Immunologic: Negative. Neurological: Negative. Hematological: Negative. Psychiatric/Behavioral: Negative. OBJECTIVE:  /80 (Site: Right Upper Arm, Position: Sitting, Cuff Size: Large Adult)   Pulse 89   Temp 97.5 °F (36.4 °C) (Temporal)   Resp 16   Ht 5' 11\" (1.803 m)   Wt 296 lb 9.6 oz (134.5 kg)   SpO2 97% Comment: room air  BMI 41.37 kg/m²    Physical Exam  Vitals and nursing note reviewed. Constitutional:       Appearance: He is well-developed. HENT:      Head: Normocephalic and atraumatic. Eyes:      Conjunctiva/sclera: Conjunctivae normal.      Pupils: Pupils are equal, round, and reactive to light. Neck:      Thyroid: No thyromegaly. Vascular: No JVD. Cardiovascular:      Rate and Rhythm: Normal rate and regular rhythm. Heart sounds: No murmur heard. No friction rub. No gallop. Pulmonary:      Effort: Pulmonary effort is normal. No respiratory distress. Breath sounds: Normal breath sounds. No wheezing or rales. Abdominal:      General: Bowel sounds are normal. There is no distension. Palpations: Abdomen is soft. Tenderness: There is no abdominal tenderness. There is no guarding. Musculoskeletal:         General: No tenderness. Cervical back: Normal range of motion and neck supple. Feet:      Right foot:      Toenail Condition: Fungal disease present. Left foot:      Toenail Condition: Fungal disease present. Comments: Flat arch  Skin:     General: Skin is warm and dry. Findings: No rash. Neurological:      Mental Status: He is alert and oriented to person, place, and time. Psychiatric:         Mood and Affect: Mood is anxious.          Judgment: Judgment normal.         Results in Past 30 Days  Result Component Current Result Ref Range Previous Result Ref Range   Albumin/Globulin Ratio 2.2 (H) (11/1/2021) 0.8 - 2.0 Not in Time Range    Albumin 4.8 (11/1/2021) 3.4 - 4.8 g/dL Not in Time Range    Alkaline Phosphatase 72 (11/1/2021) 25 - 100 U/L Not in Time Range    ALT 29 (11/1/2021) 4 - 36 U/L Not in Time Range    AST 23 (11/1/2021) 8 - 33 U/L Not in Time Range    BUN 10 (11/1/2021) 6 - 20 mg/dL Not in Time Range    Calcium 9.8 (11/1/2021) 8.5 - 10.5 mg/dL Not in Time Range    Chloride 103 (11/1/2021) 98 - 107 mmol/L Not in Time Range    CO2 29 (11/1/2021) 20 - 30 mmol/L Not in Time Range    CREATININE 0.9 (11/1/2021) 0.4 - 1.2 mg/dL Not in Time Range    GFR  >59 (11/1/2021) >59 Not in Time Range    GFR Non- >59 (11/1/2021) >59 Not in Time Range    Globulin 2.2 (11/1/2021) Not Established g/dL Not in Time Range    Glucose 91 (11/1/2021) 74 - 106 mg/dL Not in Time Range    Potassium 4.7 (11/1/2021) 3.4 - 5.1 mmol/L Not in Time Range    Sodium 140 (11/1/2021) 136 - 145 mmol/L Not in Time Range    Total Bilirubin 0.5 (11/1/2021) 0.3 - 1.2 mg/dL Not in Time Range    Total Protein 7.0 (11/1/2021) 6.4 - 8.3 g/dL Not in Time Range        LDL Calculated (mg/dL)   Date Value   11/01/2021 71         Lab Results   Component Value Date    WBC 6.2 11/01/2021    NEUTROABS 2.4 03/13/2020    HGB 15.8 11/01/2021    HCT 45.4 11/01/2021    MCV 92.3 11/01/2021     11/01/2021       Lab Results   Component Value Date    TSH 1.48 11/01/2021         ASSESSMENT/PLAN:     1. Tobacco abuse  chantix starter, smoking cessation education. 2. Class 3 severe obesity due to excess calories without serious comorbidity with body mass index (BMI) of 40.0 to 44.9 in adult [facility])  Discussed diet exercise and recommend to quit smoking first then proceed with weight loss. 3. Toenail fungus    - terbinafine (LAMISIL) 250 MG tablet; Take 1 tablet by mouth daily  Dispense: 42 tablet; Refill: 0  - COMPREHENSIVE METABOLIC PANEL; Future  - Comprehensive Metabolic Panel; Future  - CBC; Future    4.  Pes planus of both feet    - External Referral To Podiatry    5. Thyroid disorder screen    - TSH without Reflex; Future    6. B12 deficiency    - Vitamin B12 & Folate; Future    7. Screening cholesterol level    - Lipid Panel; Future    8. Vitamin D deficiency    - Vitamin D 25 Hydroxy;  Future       Written by Susie DSOUZA, acting as a scribe for Mery Gan on 11/1/2021 at 11:01 PM.

## 2021-11-03 DIAGNOSIS — M25.50 ARTHRALGIA, UNSPECIFIED JOINT: ICD-10-CM

## 2021-11-03 DIAGNOSIS — M75.22 BICEPS TENDINITIS OF LEFT UPPER EXTREMITY: ICD-10-CM

## 2021-11-03 DIAGNOSIS — B35.1 TOENAIL FUNGUS: ICD-10-CM

## 2021-11-03 DIAGNOSIS — Z72.0 TOBACCO ABUSE: ICD-10-CM

## 2021-11-03 RX ORDER — VARENICLINE TARTRATE
KIT
Qty: 1 BOX | Refills: 0 | Status: SHIPPED | OUTPATIENT
Start: 2021-11-03 | End: 2022-02-03 | Stop reason: ALTCHOICE

## 2021-12-08 ENCOUNTER — OFFICE VISIT (OUTPATIENT)
Dept: PRIMARY CARE CLINIC | Age: 26
End: 2021-12-08
Payer: COMMERCIAL

## 2021-12-08 VITALS
DIASTOLIC BLOOD PRESSURE: 90 MMHG | BODY MASS INDEX: 41.16 KG/M2 | SYSTOLIC BLOOD PRESSURE: 132 MMHG | RESPIRATION RATE: 16 BRPM | HEIGHT: 71 IN | WEIGHT: 294 LBS | TEMPERATURE: 97.1 F | HEART RATE: 73 BPM | OXYGEN SATURATION: 98 %

## 2021-12-08 DIAGNOSIS — F42.8 OBSESSIVE THINKING: Primary | ICD-10-CM

## 2021-12-08 DIAGNOSIS — B35.1 TOENAIL FUNGUS: ICD-10-CM

## 2021-12-08 DIAGNOSIS — R03.0 ELEVATED BLOOD PRESSURE READING: ICD-10-CM

## 2021-12-08 DIAGNOSIS — Z72.0 TOBACCO ABUSE: ICD-10-CM

## 2021-12-08 PROCEDURE — 99214 OFFICE O/P EST MOD 30 MIN: CPT | Performed by: NURSE PRACTITIONER

## 2021-12-08 RX ORDER — FLUVOXAMINE MALEATE 25 MG
25 TABLET ORAL NIGHTLY
Qty: 30 TABLET | Refills: 3 | Status: SHIPPED | OUTPATIENT
Start: 2021-12-08 | End: 2022-02-03 | Stop reason: DRUGHIGH

## 2021-12-08 ASSESSMENT — ENCOUNTER SYMPTOMS
EYES NEGATIVE: 1
GASTROINTESTINAL NEGATIVE: 1
ALLERGIC/IMMUNOLOGIC NEGATIVE: 1
RESPIRATORY NEGATIVE: 1

## 2021-12-08 NOTE — PROGRESS NOTES
Chief Complaint   Patient presents with   [address]       Have you seen any other physician or provider since your last visit yes - podiatrist    Have you had any other diagnostic tests since your last visit? yes - labs    Have you changed or stopped any medications since your last visit? yes - never received Chantix          SUBJECTIVE:    Patient ID:Hever Acharya is a 27 y.o. male. Chief Complaint   Patient presents with    Discuss Labs     HPI:  Patient is here to follow up on labs. He was unable to get the Chantix and is still smoking 1 pack daily. He is currently using nicotine pouches. He was seen by Podiatrist in [place] for shoe inserts. He was told that he would eventually have arthritis in his ankles. Allen foot and ankle. He was told that his ankle rolls in and causes him to walk off gait. He has some problems with toenail fungus he wants evaluated. His blood pressure is elevated today. He reports a lot of stress and obsessive. He will have some things on his mind. He was unable to get the Chantix since on backorder for his smoking. He is going to try to quit smoking. He is just really having a lot of issues with obsessive thinking and behaviors. He is wanting really to do a whole lot better with all those things. 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 Patient's medications, allergies, past medical, surgical, social and family histories were reviewed and updated as appropriate. Review of Systems   Constitutional: Negative. HENT: Negative. Eyes: Negative. Respiratory: Negative. Cardiovascular: Negative. Gastrointestinal: Negative. Endocrine: Negative. Genitourinary: Negative. Musculoskeletal: Negative. Skin: Negative. Allergic/Immunologic: Negative. Neurological: Negative. Hematological: Negative. Psychiatric/Behavioral: Positive for agitation, behavioral problems and decreased concentration. OBJECTIVE:  BP (!) 132/90 (Site: Left Upper Arm, Position: Sitting, Cuff Size: Large Adult)   Pulse 73   Temp 97.1 °F (36.2 °C) (Temporal)   Resp 16   Ht 5' 11\" (1.803 m)   Wt 294 lb (133.4 kg)   SpO2 98% Comment: room air  BMI 41.00 kg/m²    Physical Exam  Vitals and nursing note reviewed. Constitutional:       Appearance: He is well-developed. HENT:      Head: Normocephalic and atraumatic. Eyes:      Conjunctiva/sclera: Conjunctivae normal.      Pupils: Pupils are equal, round, and reactive to light. Neck:      Thyroid: No thyromegaly. Vascular: No JVD. Cardiovascular:      Rate and Rhythm: Normal rate and regular rhythm. Heart sounds: No murmur heard. No friction rub. No gallop. Pulmonary:      Effort: Pulmonary effort is normal. No respiratory distress. Breath sounds: Normal breath sounds. No wheezing or rales. Abdominal:      General: Bowel sounds are normal. There is no distension. Palpations: Abdomen is soft. Tenderness: There is no abdominal tenderness. There is no guarding. Musculoskeletal:         General: No tenderness. Cervical back: Normal range of motion and neck supple. Feet:      Right foot:      Toenail Condition: Fungal disease present. Left foot:      Toenail Condition: Fungal disease present. Skin:     General: Skin is warm and dry. Findings: No rash. Neurological:      Mental Status: He is alert and oriented to person, place, and time. Psychiatric:         Mood and Affect: Mood is anxious and depressed. Speech: Speech is rapid and pressured. Judgment: Judgment normal.         No results found for requested labs within last 30 days.        LDL Calculated (mg/dL)   Date Value   11/01/2021 71         Lab Results   Component Value Date    WBC 6.2 11/01/2021    NEUTROABS 2.4 03/13/2020    HGB 15.8 11/01/2021 HCT 45.4 11/01/2021    MCV 92.3 11/01/2021     11/01/2021       Lab Results   Component Value Date    TSH 1.48 11/01/2021         ASSESSMENT/PLAN:     1. Obsessive thinking  For behavioral counseling screen. We will start him on a medication for obsessive-compulsive start low and see how he tolerates and what he thinks. We will have him return for titration.  - fluvoxaMINE (LUVOX) 25 MG tablet; Take 1 tablet by mouth nightly  Dispense: 30 tablet; Refill: 3    2. Tobacco abuse  Discussed smoking cessation including nicotine patches and other options. 3. Toenail fungus  Tarted on Lamisil monitor in 6-8    4. Elevated blood pressure reading  Have him on a low sugar at home and when he returns with blood pressure remains elevated will start medications.        Written by Dhruv DSOUZA, acting as a scribe for Liz Spice on 12/8/2021 at 1:43 PM.

## 2022-01-25 ENCOUNTER — OFFICE VISIT (OUTPATIENT)
Dept: PRIMARY CARE CLINIC | Age: 27
End: 2022-01-25
Payer: COMMERCIAL

## 2022-01-25 VITALS
SYSTOLIC BLOOD PRESSURE: 122 MMHG | WEIGHT: 288 LBS | HEART RATE: 89 BPM | BODY MASS INDEX: 40.17 KG/M2 | TEMPERATURE: 99.1 F | DIASTOLIC BLOOD PRESSURE: 84 MMHG

## 2022-01-25 DIAGNOSIS — F42.8 OBSESSIVE THINKING: Primary | ICD-10-CM

## 2022-01-25 DIAGNOSIS — E66.01 CLASS 3 SEVERE OBESITY DUE TO EXCESS CALORIES WITHOUT SERIOUS COMORBIDITY WITH BODY MASS INDEX (BMI) OF 40.0 TO 44.9 IN ADULT (HCC): ICD-10-CM

## 2022-01-25 DIAGNOSIS — E55.9 VITAMIN D DEFICIENCY: ICD-10-CM

## 2022-01-25 DIAGNOSIS — E53.8 B12 DEFICIENCY: ICD-10-CM

## 2022-01-25 PROCEDURE — 99214 OFFICE O/P EST MOD 30 MIN: CPT | Performed by: NURSE PRACTITIONER

## 2022-01-25 RX ORDER — FLUVOXAMINE MALEATE 50 MG/1
50 TABLET, COATED ORAL NIGHTLY
Qty: 30 TABLET | Refills: 3 | Status: SHIPPED | OUTPATIENT
Start: 2022-01-25 | End: 2022-06-07

## 2022-01-25 ASSESSMENT — ENCOUNTER SYMPTOMS
RESPIRATORY NEGATIVE: 1
GASTROINTESTINAL NEGATIVE: 1
EYES NEGATIVE: 1

## 2022-01-25 NOTE — PROGRESS NOTES
SUBJECTIVE:    Patient ID: Tim Cevallos is a 27 y.o. male. No chief complaint on file. HPI:  He comes in to follow up on the new medication. He says that since starting the medication he has been doing a little bit better with his obsessive thinking. He has been having better mood. He still been doing some behaviors that he knows he is not supposed to. Its affecting his marriage. He denies any side effects from the medication. He is still trying to pursue some counseling. He did some telehealth but he did not like it and he is trying to get into an office where he can go for therapy. He is doing pretty well and does work. He still continues to smoke and use trying to work on cutting down and eventually quitting  Patient's medications,allergies, past medical, surgical, social and family histories were reviewed and updated as appropriate. .  No current outpatient medications on file prior to visit. No current facility-administered medications on file prior to visit. Review of Systems   Constitutional: Negative. HENT: Negative. Eyes: Negative. Respiratory: Negative. Cardiovascular: Negative. Gastrointestinal: Negative. Genitourinary: Negative. Musculoskeletal: Negative. Skin: Negative. Neurological: Negative. Psychiatric/Behavioral: Positive for behavioral problems and decreased concentration. The patient is nervous/anxious. OBJECTIVE:  /84   Pulse 89   Temp 99.1 °F (37.3 °C)   Wt 288 lb (130.6 kg)   BMI 40.17 kg/m²    Physical Exam  Vitals and nursing note reviewed. Constitutional:       Appearance: He is well-developed. HENT:      Head: Normocephalic and atraumatic. Eyes:      Conjunctiva/sclera: Conjunctivae normal.      Pupils: Pupils are equal, round, and reactive to light. Neck:      Thyroid: No thyromegaly. Vascular: No JVD. Cardiovascular:      Rate and Rhythm: Normal rate and regular rhythm.       Heart sounds: No murmur heard.  No friction rub. No gallop. Pulmonary:      Effort: Pulmonary effort is normal. No respiratory distress. Breath sounds: Normal breath sounds. No wheezing or rales. Abdominal:      General: Bowel sounds are normal. There is no distension. Palpations: Abdomen is soft. Tenderness: There is no abdominal tenderness. There is no guarding. Musculoskeletal:         General: No tenderness. Cervical back: Normal range of motion and neck supple. Skin:     General: Skin is warm and dry. Findings: No rash. Neurological:      Mental Status: He is alert and oriented to person, place, and time. Psychiatric:         Mood and Affect: Mood is anxious. Judgment: Judgment normal.         No results found for requested labs within last 30 days. LDL Calculated (mg/dL)   Date Value   11/01/2021 71           Lab Results   Component Value Date    TSH 1.48 11/01/2021         ASSESSMENT/PLAN:     Diagnoses and all orders for this visit:    Obsessive thinking  -     fluvoxaMINE (LUVOX) 50 MG tablet; Take 1 tablet by mouth nightly  We will increase dose gave him names of therapist to try to get an appointment. Class 3 severe obesity due to excess calories without serious comorbidity with body mass index (BMI) of 40.0 to 44.9 in [facility])  Discussed diet weight loss options. B12 deficiency  Stable  Vitamin D deficiency  Continue replacement.      Medications Discontinued During This Encounter   Medication Reason    varenicline (CHANTIX STARTING MONTH PAK) 0.5 MG X 11 & 1 MG X 42 tablet Therapy completed    fluvoxaMINE (LUVOX) 25 MG tablet DOSE ADJUSTMENT

## 2022-02-11 DIAGNOSIS — B35.1 TOENAIL FUNGUS: ICD-10-CM

## 2022-02-16 RX ORDER — TERBINAFINE HYDROCHLORIDE 250 MG/1
250 TABLET ORAL DAILY
Qty: 42 TABLET | Refills: 0 | Status: SHIPPED | OUTPATIENT
Start: 2022-02-16 | End: 2022-09-12

## 2022-03-28 ENCOUNTER — OFFICE VISIT (OUTPATIENT)
Dept: PRIMARY CARE CLINIC | Age: 27
End: 2022-03-28
Payer: COMMERCIAL

## 2022-03-28 VITALS
TEMPERATURE: 97.6 F | HEIGHT: 71 IN | OXYGEN SATURATION: 98 % | RESPIRATION RATE: 16 BRPM | WEIGHT: 287.6 LBS | SYSTOLIC BLOOD PRESSURE: 110 MMHG | DIASTOLIC BLOOD PRESSURE: 70 MMHG | HEART RATE: 72 BPM | BODY MASS INDEX: 40.26 KG/M2

## 2022-03-28 DIAGNOSIS — F42.8 OBSESSIVE THINKING: Primary | ICD-10-CM

## 2022-03-28 DIAGNOSIS — B35.1 TOENAIL FUNGUS: ICD-10-CM

## 2022-03-28 PROCEDURE — 99213 OFFICE O/P EST LOW 20 MIN: CPT | Performed by: NURSE PRACTITIONER

## 2022-03-28 ASSESSMENT — ENCOUNTER SYMPTOMS
RESPIRATORY NEGATIVE: 1
GASTROINTESTINAL NEGATIVE: 1
EYES NEGATIVE: 1
ALLERGIC/IMMUNOLOGIC NEGATIVE: 1

## 2022-06-06 DIAGNOSIS — F42.8 OBSESSIVE THINKING: ICD-10-CM

## 2022-06-07 RX ORDER — FLUVOXAMINE MALEATE 50 MG/1
TABLET, COATED ORAL
Qty: 30 TABLET | Refills: 3 | Status: SHIPPED | OUTPATIENT
Start: 2022-06-07 | End: 2022-10-10 | Stop reason: ALTCHOICE

## 2022-07-18 ENCOUNTER — OFFICE VISIT (OUTPATIENT)
Dept: PRIMARY CARE CLINIC | Age: 27
End: 2022-07-18
Payer: COMMERCIAL

## 2022-07-18 VITALS — RESPIRATION RATE: 18 BRPM | TEMPERATURE: 98 F | HEART RATE: 74 BPM | OXYGEN SATURATION: 98 %

## 2022-07-18 DIAGNOSIS — Z71.89 EDUCATED ABOUT COVID-19 VIRUS INFECTION: ICD-10-CM

## 2022-07-18 DIAGNOSIS — R50.9 FEVER, UNSPECIFIED FEVER CAUSE: ICD-10-CM

## 2022-07-18 DIAGNOSIS — Z20.822 SUSPECTED COVID-19 VIRUS INFECTION: ICD-10-CM

## 2022-07-18 DIAGNOSIS — B34.9 ACUTE VIRAL SYNDROME: Primary | ICD-10-CM

## 2022-07-18 LAB
INFLUENZA A ANTIBODY: NORMAL
INFLUENZA B ANTIBODY: NORMAL
Lab: NORMAL
QC PASS/FAIL: NORMAL
SARS-COV-2 RDRP RESP QL NAA+PROBE: NEGATIVE

## 2022-07-18 PROCEDURE — 87804 INFLUENZA ASSAY W/OPTIC: CPT | Performed by: PHYSICIAN ASSISTANT

## 2022-07-18 PROCEDURE — 87635 SARS-COV-2 COVID-19 AMP PRB: CPT | Performed by: PHYSICIAN ASSISTANT

## 2022-07-18 PROCEDURE — 99213 OFFICE O/P EST LOW 20 MIN: CPT | Performed by: PHYSICIAN ASSISTANT

## 2022-07-18 ASSESSMENT — ENCOUNTER SYMPTOMS
COUGH: 0
CONSTIPATION: 0
NAUSEA: 1
ABDOMINAL PAIN: 0
EYE PAIN: 0
SHORTNESS OF BREATH: 0
SORE THROAT: 0
DIARRHEA: 0

## 2022-07-18 ASSESSMENT — PATIENT HEALTH QUESTIONNAIRE - PHQ9: DEPRESSION UNABLE TO ASSESS: URGENT/EMERGENT SITUATION

## 2022-07-18 NOTE — PATIENT INSTRUCTIONS
Keep a list of your medicines with you. List all of the prescription medicines, nonprescription medicines, supplements, natural remedies, and vitamins that you take. Tell your healthcare providers who treat you about all of the products you are taking. Your provider can provide you with a form to keep track of them. Just ask. Follow the directions that come with your medicine, including information about food or alcohol. Make sure you know how and when to take your medicine. Do not take more or less than you are supposed to take. Keep all medicines out of the reach of children. Store medicines according to the directions on the label. Monitor yourself. Learn to know how your body reacts to your new medicine and keep track of how it makes you feel before attempting (If your provider has allowed you to do so) to drive or go to work. Seek emergency medical attention if you think you have used too much of this medicine. An overdose of any prescription medicine can be fatal. Overdose symptoms may include extreme drowsiness, muscle weakness, confusion, cold and clammy skin, pinpoint pupils, shallow breathing, slow heart rate, fainting, or coma. Don't share prescription medicines with others, even when they seem to have the same symptoms. What may be good for you may be harmful to others. If you are no longer taking a prescribed medication and you have pills left please take your pills out of their original containers. Mix crushed pills with an undesirable substance, such as cat litter or used coffee grounds. Put the mixture into a disposable container with a lid, such as an empty margarine tub, or into a sealable bag. Cover up or remove any of your personal information on the empty containers by covering it with black permanent marker or duct tape. Place the sealed container with the mixture, and the empty drug containers, in the trash.    If you use a medication that is in the form of a patch, dispose of used

## 2022-07-18 NOTE — LETTER
[facility]  [address] [address]  Phone: [phone]  Fax: [phone]    Margarito Álvarez PA-C        July 18, 2022     Patient: Rian Ghosh   YOB: 1995   Date of Visit: 7/18/2022       To Whom It May Concern: It is my medical opinion that Leo Stoner may return to work on 7/21/22. If you have any questions or concerns, please don't hesitate to call.     Sincerely,        Margarito Álvarez PA-C

## 2022-07-18 NOTE — PROGRESS NOTES
Chief Complaint   Patient presents with    Fever    Generalized Body Aches    Nausea       Pt stated symptoms started Saturday. Fever, body aches, and nausea.

## 2022-07-18 NOTE — PROGRESS NOTES
Damion Crowder (:  1995) is a 32 y.o. male,Established patient, here for evaluation of the following chief complaint(s):  Fever, Generalized Body Aches, and Nausea           Subjective   SUBJECTIVE/OBJECTIVE:  HPI  Mr. Joel Joseph is a 68-year-old white male presents with 4-day history of fever, generalized body aches and nausea. He admits to lower abdominal pain. Denies any vomiting or diarrhea. He denies any exposure to sick contacts. Review of Systems   Constitutional:  Positive for chills, fatigue and fever. HENT:  Negative for congestion, ear pain, nosebleeds and sore throat. Eyes:  Negative for pain and visual disturbance. Respiratory:  Negative for cough and shortness of breath. Cardiovascular:  Negative for chest pain and palpitations. Gastrointestinal:  Positive for nausea. Negative for abdominal pain, constipation and diarrhea. Genitourinary:  Negative for difficulty urinating and flank pain. Musculoskeletal:  Negative for arthralgias, gait problem and myalgias. Skin:  Negative for rash. Neurological:  Negative for syncope, light-headedness and headaches. Psychiatric/Behavioral:  Negative for behavioral problems, confusion and dysphoric mood. Objective     Vitals:    22 0939   Pulse: 74   Resp: 18   Temp: 98 °F (36.7 °C)   TempSrc: Temporal   SpO2: 98%     Physical Exam  Vitals reviewed. Constitutional:       Appearance: Normal appearance. HENT:      Head: Normocephalic and atraumatic. Right Ear: External ear normal.      Left Ear: External ear normal.      Nose: Nose normal.      Mouth/Throat:      Mouth: Mucous membranes are moist.      Pharynx: Oropharynx is clear. Eyes:      Extraocular Movements: Extraocular movements intact. Conjunctiva/sclera: Conjunctivae normal.      Pupils: Pupils are equal, round, and reactive to light. Cardiovascular:      Rate and Rhythm: Normal rate and regular rhythm. Pulses: Normal pulses.       Heart sounds: Normal heart sounds. Pulmonary:      Effort: Pulmonary effort is normal.      Breath sounds: Normal breath sounds. Abdominal:      General: Bowel sounds are normal.      Palpations: Abdomen is soft. Musculoskeletal:         General: Normal range of motion. Cervical back: Normal range of motion. Skin:     General: Skin is warm. Findings: No rash. Neurological:      General: No focal deficit present. Mental Status: He is alert. Psychiatric:         Mood and Affect: Mood normal.         Thought Content: Thought content normal.               ASSESSMENT/PLAN:  1. Acute viral syndrome  Rapid Influenza and COVID were negative today  I advised symptomatic treatment with rotation of Tylenol and Advil and hydration with Gatorade 0 or Powerade 0. He declined the need for prescription medication for nausea. I will give him a work excuse for the next 2 days and have advised him to return to clinic if signs or symptoms worsen. Have also supplied him with some home to rapid COVID test kits advised him to swab again in the morning. 2. Suspected COVID-19 virus infection  Results for POC orders placed in visit on 07/18/22   POCT Influenza A/B   Result Value Ref Range    Influenza A Ab neg     Influenza B Ab neg        - POCT COVID-19 Rapid, NAAT was negative    3. Educated about COVID-19 virus infection  - POCT COVID-19 Rapid, NAAT    4. Fever, unspecified fever cause  - POCT Influenza A/B        Return if symptoms worsen or fail to improve. An electronic signature was used to authenticate this note.     --Rama Tabor PA-C

## 2022-09-09 DIAGNOSIS — B35.1 TOENAIL FUNGUS: ICD-10-CM

## 2022-09-12 RX ORDER — TERBINAFINE HYDROCHLORIDE 250 MG/1
250 TABLET ORAL DAILY
Qty: 42 TABLET | Refills: 0 | Status: SHIPPED | OUTPATIENT
Start: 2022-09-12 | End: 2022-10-24

## 2022-10-10 ENCOUNTER — OFFICE VISIT (OUTPATIENT)
Dept: PRIMARY CARE CLINIC | Age: 27
End: 2022-10-10
Payer: COMMERCIAL

## 2022-10-10 VITALS
SYSTOLIC BLOOD PRESSURE: 122 MMHG | TEMPERATURE: 96.8 F | OXYGEN SATURATION: 97 % | HEIGHT: 71 IN | WEIGHT: 283 LBS | HEART RATE: 79 BPM | RESPIRATION RATE: 16 BRPM | DIASTOLIC BLOOD PRESSURE: 78 MMHG | BODY MASS INDEX: 39.62 KG/M2

## 2022-10-10 DIAGNOSIS — F42.8 OBSESSIVE THINKING: ICD-10-CM

## 2022-10-10 DIAGNOSIS — B35.1 TOENAIL FUNGUS: ICD-10-CM

## 2022-10-10 DIAGNOSIS — F90.0 ATTENTION DEFICIT HYPERACTIVITY DISORDER (ADHD), PREDOMINANTLY INATTENTIVE TYPE: Primary | ICD-10-CM

## 2022-10-10 PROCEDURE — 99213 OFFICE O/P EST LOW 20 MIN: CPT | Performed by: NURSE PRACTITIONER

## 2022-10-10 RX ORDER — DEXTROAMPHETAMINE SACCHARATE, AMPHETAMINE ASPARTATE MONOHYDRATE, DEXTROAMPHETAMINE SULFATE AND AMPHETAMINE SULFATE 5; 5; 5; 5 MG/1; MG/1; MG/1; MG/1
CAPSULE, EXTENDED RELEASE ORAL
COMMUNITY
Start: 2022-10-04

## 2022-10-10 ASSESSMENT — PATIENT HEALTH QUESTIONNAIRE - PHQ9
SUM OF ALL RESPONSES TO PHQ QUESTIONS 1-9: 2
2. FEELING DOWN, DEPRESSED OR HOPELESS: 1
SUM OF ALL RESPONSES TO PHQ9 QUESTIONS 1 & 2: 2
SUM OF ALL RESPONSES TO PHQ QUESTIONS 1-9: 2
1. LITTLE INTEREST OR PLEASURE IN DOING THINGS: 1

## 2022-10-10 ASSESSMENT — ENCOUNTER SYMPTOMS
EYES NEGATIVE: 1
ALLERGIC/IMMUNOLOGIC NEGATIVE: 1
RESPIRATORY NEGATIVE: 1
GASTROINTESTINAL NEGATIVE: 1

## 2022-10-10 NOTE — PROGRESS NOTES
Chief Complaint   Patient presents with    Follow-up    ADHD    Anxiety       Have you seen any other physician or provider since your last visit yes - therapist and     Have you had any other diagnostic tests since your last visit? no    Have you changed or stopped any medications since your last visit? yes - stopped Luvox and Wellbutrin and started Adderall       SUBJECTIVE:    Patient ID:Hever Spencer is a 32 y.o. male. Chief Complaint   Patient presents with    Follow-up    ADHD    Anxiety     HPI:  Patient is here to follow up on OCD and ADD. He has been seeing a therapist and also went to [facility] in [place] and started on Adderall. He is doing  better with the Adderall and his focus. He did some testing and has started on Adderall for the first week. He is not taking the Luvox now. He weaned off of the Luvox because he didn't feel like he was doing anything. He is staying on track a little better. He is functioning. He thinks home is doing better. He has picked up the Lamisil and has started back to get the toenails treated. He is still smoking. He still complains of anxiety with frustrating. Patient's medications, allergies, past medical, surgical, social and family histories were reviewed and updated as appropriate. Review of Systems   Constitutional: Negative. HENT: Negative. Eyes: Negative. Respiratory: Negative. Cardiovascular: Negative. Gastrointestinal: Negative. Endocrine: Negative. Genitourinary: Negative. Musculoskeletal: Negative. Skin: Negative. Allergic/Immunologic: Negative. Neurological: Negative. Hematological: Negative. Psychiatric/Behavioral:  The patient is nervous/anxious.       OBJECTIVE:  /78 (Site: Right Upper Arm, Position: Sitting, Cuff Size: Large Adult)   Pulse 79   Temp 96.8 °F (36 °C) (Temporal)   Resp 16   Ht 5' 11\" (1.803 m)   Wt 283 lb (128.4 kg)   SpO2 97% Comment: room air  BMI 39.47 kg/m²    Physical Exam  Vitals and nursing note reviewed. Constitutional:       Appearance: He is well-developed. HENT:      Head: Normocephalic and atraumatic. Eyes:      Conjunctiva/sclera: Conjunctivae normal.      Pupils: Pupils are equal, round, and reactive to light. Neck:      Thyroid: No thyromegaly. Vascular: No JVD. Cardiovascular:      Rate and Rhythm: Normal rate and regular rhythm. Heart sounds: No murmur heard. No friction rub. No gallop. Pulmonary:      Effort: Pulmonary effort is normal. No respiratory distress. Breath sounds: Normal breath sounds. No wheezing or rales. Abdominal:      General: Bowel sounds are normal. There is no distension. Palpations: Abdomen is soft. Tenderness: There is no abdominal tenderness. There is no guarding. Musculoskeletal:         General: No tenderness. Cervical back: Normal range of motion and neck supple. Skin:     General: Skin is warm and dry. Findings: No rash. Neurological:      Mental Status: He is alert and oriented to person, place, and time. Psychiatric:         Mood and Affect: Mood is anxious. Judgment: Judgment normal.       No results found for requested labs within last 30 days. LDL Calculated (mg/dL)   Date Value   11/01/2021 71         Lab Results   Component Value Date/Time    WBC 6.2 11/01/2021 11:25 AM    NEUTROABS 2.4 03/13/2020 01:40 PM    HGB 15.8 11/01/2021 11:25 AM    HCT 45.4 11/01/2021 11:25 AM    MCV 92.3 11/01/2021 11:25 AM     11/01/2021 11:25 AM       Lab Results   Component Value Date    TSH 1.48 11/01/2021         ASSESSMENT/PLAN:     1. Attention deficit hyperactivity disorder (ADHD), predominantly inattentive type  He has started on Adderall. 2. Obsessive thinking  He is currently not taking the Luvox. 3. Toenail fungus  Lamisil.        Written by Jamison DSOUZA, acting as a scribe for Mac Concepción on 10/10/2022 at 6:33 PM.

## 2022-12-08 DIAGNOSIS — B35.1 TOENAIL FUNGUS: ICD-10-CM

## 2022-12-09 RX ORDER — TERBINAFINE HYDROCHLORIDE 250 MG/1
250 TABLET ORAL DAILY
Qty: 42 TABLET | Refills: 0 | Status: SHIPPED | OUTPATIENT
Start: 2022-12-09 | End: 2023-01-20

## 2022-12-12 ENCOUNTER — OFFICE VISIT (OUTPATIENT)
Dept: PRIMARY CARE CLINIC | Age: 27
End: 2022-12-12
Payer: COMMERCIAL

## 2022-12-12 ENCOUNTER — HOSPITAL ENCOUNTER (OUTPATIENT)
Facility: HOSPITAL | Age: 27
Discharge: HOME | End: 2022-12-12
Payer: COMMERCIAL

## 2022-12-12 VITALS
TEMPERATURE: 97.6 F | OXYGEN SATURATION: 97 % | BODY MASS INDEX: 39.2 KG/M2 | DIASTOLIC BLOOD PRESSURE: 84 MMHG | HEIGHT: 71 IN | SYSTOLIC BLOOD PRESSURE: 136 MMHG | RESPIRATION RATE: 14 BRPM | HEART RATE: 102 BPM | WEIGHT: 280 LBS

## 2022-12-12 DIAGNOSIS — Z13.29 THYROID DISORDER SCREEN: ICD-10-CM

## 2022-12-12 DIAGNOSIS — B35.1 TOENAIL FUNGUS: Primary | ICD-10-CM

## 2022-12-12 DIAGNOSIS — B35.1 TOENAIL FUNGUS: ICD-10-CM

## 2022-12-12 DIAGNOSIS — F42.8 OBSESSIVE THINKING: ICD-10-CM

## 2022-12-12 DIAGNOSIS — Z13.220 SCREENING CHOLESTEROL LEVEL: ICD-10-CM

## 2022-12-12 DIAGNOSIS — Z79.899 MEDICATION MANAGEMENT: ICD-10-CM

## 2022-12-12 DIAGNOSIS — E55.9 VITAMIN D DEFICIENCY: ICD-10-CM

## 2022-12-12 LAB
A/G RATIO: 2.1 (ref 0.8–2)
ALBUMIN SERPL-MCNC: 4.5 G/DL (ref 3.4–4.8)
ALP BLD-CCNC: 64 U/L (ref 25–100)
ALT SERPL-CCNC: 22 U/L (ref 4–36)
ANION GAP SERPL CALCULATED.3IONS-SCNC: 11 MMOL/L (ref 3–16)
AST SERPL-CCNC: 21 U/L (ref 8–33)
BILIRUB SERPL-MCNC: 0.7 MG/DL (ref 0.3–1.2)
BUN BLDV-MCNC: 11 MG/DL (ref 6–20)
CALCIUM SERPL-MCNC: 9.1 MG/DL (ref 8.5–10.5)
CHLORIDE BLD-SCNC: 102 MMOL/L (ref 98–107)
CHOLESTEROL, TOTAL: 143 MG/DL (ref 0–200)
CO2: 24 MMOL/L (ref 20–30)
CREAT SERPL-MCNC: 0.9 MG/DL (ref 0.4–1.2)
FOLATE: 9.69 NG/ML
GFR SERPL CREATININE-BSD FRML MDRD: >60 ML/MIN/{1.73_M2}
GLOBULIN: 2.1 G/DL
GLUCOSE BLD-MCNC: 91 MG/DL (ref 74–106)
HCT VFR BLD CALC: 45.9 % (ref 40–54)
HDLC SERPL-MCNC: 38 MG/DL (ref 40–60)
HEMOGLOBIN: 16.7 G/DL (ref 13–18)
LDL CHOLESTEROL CALCULATED: 83 MG/DL
MCH RBC QN AUTO: 32.3 PG (ref 27–32)
MCHC RBC AUTO-ENTMCNC: 36.4 G/DL (ref 31–35)
MCV RBC AUTO: 88.8 FL (ref 80–100)
PDW BLD-RTO: 11.3 % (ref 11–16)
PLATELET # BLD: 174 K/UL (ref 150–400)
PMV BLD AUTO: 9.8 FL (ref 6–10)
POTASSIUM SERPL-SCNC: 4 MMOL/L (ref 3.4–5.1)
RBC # BLD: 5.17 M/UL (ref 4.5–6)
SODIUM BLD-SCNC: 137 MMOL/L (ref 136–145)
TOTAL PROTEIN: 6.6 G/DL (ref 6.4–8.3)
TRIGL SERPL-MCNC: 111 MG/DL (ref 0–249)
TSH SERPL DL<=0.05 MIU/L-ACNC: 1 UIU/ML (ref 0.27–4.2)
VITAMIN B-12: 538 PG/ML (ref 211–911)
VITAMIN D 25-HYDROXY: 33.4 (ref 32–100)
VLDLC SERPL CALC-MCNC: 22 MG/DL
WBC # BLD: 5.2 K/UL (ref 4–11)

## 2022-12-12 PROCEDURE — 82306 VITAMIN D 25 HYDROXY: CPT

## 2022-12-12 PROCEDURE — 36415 COLL VENOUS BLD VENIPUNCTURE: CPT

## 2022-12-12 PROCEDURE — 82607 VITAMIN B-12: CPT

## 2022-12-12 PROCEDURE — 99213 OFFICE O/P EST LOW 20 MIN: CPT | Performed by: NURSE PRACTITIONER

## 2022-12-12 PROCEDURE — 84443 ASSAY THYROID STIM HORMONE: CPT

## 2022-12-12 PROCEDURE — 80061 LIPID PANEL: CPT

## 2022-12-12 PROCEDURE — 82746 ASSAY OF FOLIC ACID SERUM: CPT

## 2022-12-12 PROCEDURE — 80053 COMPREHEN METABOLIC PANEL: CPT

## 2022-12-12 PROCEDURE — 85027 COMPLETE CBC AUTOMATED: CPT

## 2022-12-12 RX ORDER — RISPERIDONE 0.5 MG/1
0.5 TABLET ORAL 2 TIMES DAILY
COMMUNITY
Start: 2022-12-08

## 2022-12-12 SDOH — ECONOMIC STABILITY: FOOD INSECURITY: WITHIN THE PAST 12 MONTHS, THE FOOD YOU BOUGHT JUST DIDN'T LAST AND YOU DIDN'T HAVE MONEY TO GET MORE.: NEVER TRUE

## 2022-12-12 SDOH — ECONOMIC STABILITY: FOOD INSECURITY: WITHIN THE PAST 12 MONTHS, YOU WORRIED THAT YOUR FOOD WOULD RUN OUT BEFORE YOU GOT MONEY TO BUY MORE.: NEVER TRUE

## 2022-12-12 ASSESSMENT — ENCOUNTER SYMPTOMS
RESPIRATORY NEGATIVE: 1
EYES NEGATIVE: 1
GASTROINTESTINAL NEGATIVE: 1

## 2022-12-12 ASSESSMENT — SOCIAL DETERMINANTS OF HEALTH (SDOH): HOW HARD IS IT FOR YOU TO PAY FOR THE VERY BASICS LIKE FOOD, HOUSING, MEDICAL CARE, AND HEATING?: NOT VERY HARD

## 2022-12-12 NOTE — PROGRESS NOTES
SUBJECTIVE:    Patient ID: Lynette Acevedo is a 32 y.o. male. No chief complaint on file. HPI:  He recently started taking the Lamisil. He says his toenails are a lot better. He started it back 1 month ago. He is taking Resperdal and Adderall. He hasn't been overt thinking so much. Patient's medications,allergies, past medical, surgical, social and family histories were reviewed and updated as appropriate. .  Current Outpatient Medications on File Prior to Visit   Medication Sig Dispense Refill    terbinafine (LAMISIL) 250 MG tablet TAKE 1 TABLET BY MOUTH DAILY 42 tablet 0    amphetamine-dextroamphetamine (ADDERALL XR) 20 MG extended release capsule TAKE 1 CAPSULE BY MOUTH IN THE MORNING ONCE A DAY      risperiDONE (RISPERDAL) 0.5 MG tablet Take 0.5 mg by mouth 2 times daily       No current facility-administered medications on file prior to visit. Review of Systems   Constitutional: Negative. HENT: Negative. Eyes: Negative. Respiratory: Negative. Cardiovascular: Negative. Gastrointestinal: Negative. Genitourinary: Negative. Musculoskeletal: Negative. Skin: Negative. Neurological: Negative. Psychiatric/Behavioral:  Positive for behavioral problems and decreased concentration. OBJECTIVE:  /84 (Site: Left Upper Arm, Position: Sitting, Cuff Size: Large Adult)   Pulse (!) 102   Temp 97.6 °F (36.4 °C) (Temporal)   Resp 14   Ht 5' 11\" (1.803 m)   Wt 280 lb (127 kg)   SpO2 97%   BMI 39.05 kg/m²    Physical Exam  Vitals and nursing note reviewed. Constitutional:       Appearance: He is well-developed. HENT:      Head: Normocephalic and atraumatic. Eyes:      Conjunctiva/sclera: Conjunctivae normal.      Pupils: Pupils are equal, round, and reactive to light. Neck:      Thyroid: No thyromegaly. Vascular: No JVD. Cardiovascular:      Rate and Rhythm: Normal rate and regular rhythm. Heart sounds: No murmur heard. No friction rub.  No gallop. Pulmonary:      Effort: Pulmonary effort is normal. No respiratory distress. Breath sounds: Normal breath sounds. No wheezing or rales. Abdominal:      General: Bowel sounds are normal. There is no distension. Palpations: Abdomen is soft. Tenderness: There is no abdominal tenderness. There is no guarding. Musculoskeletal:         General: No tenderness. Cervical back: Normal range of motion and neck supple. Skin:     General: Skin is warm and dry. Findings: No rash. Neurological:      Mental Status: He is alert and oriented to person, place, and time. Psychiatric:         Mood and Affect: Mood is anxious and depressed.          Judgment: Judgment normal.       Results in Past 30 Days  Result Component Current Result Ref Range Previous Result Ref Range   Albumin/Globulin Ratio 2.1 (H) (12/12/2022) 0.8 - 2.0 Not in Time Range    Albumin 4.5 (12/12/2022) 3.4 - 4.8 g/dL Not in Time Range    Alkaline Phosphatase 64 (12/12/2022) 25 - 100 U/L Not in Time Range    ALT 22 (12/12/2022) 4 - 36 U/L Not in Time Range    AST 21 (12/12/2022) 8 - 33 U/L Not in Time Range    BUN 11 (12/12/2022) 6 - 20 mg/dL Not in Time Range    Calcium 9.1 (12/12/2022) 8.5 - 10.5 mg/dL Not in Time Range    Chloride 102 (12/12/2022) 98 - 107 mmol/L Not in Time Range    CO2 24 (12/12/2022) 20 - 30 mmol/L Not in Time Range    Creatinine 0.9 (12/12/2022) 0.4 - 1.2 mg/dL Not in Time Range    Est, Glom Filt Rate >60 (12/12/2022) >59 Not in Time Range    Globulin 2.1 (12/12/2022) Not Established g/dL Not in Time Range    Glucose 91 (12/12/2022) 74 - 106 mg/dL Not in Time Range    Potassium 4.0 (12/12/2022) 3.4 - 5.1 mmol/L Not in Time Range    Sodium 137 (12/12/2022) 136 - 145 mmol/L Not in Time Range    Total Bilirubin 0.7 (12/12/2022) 0.3 - 1.2 mg/dL Not in Time Range    Total Protein 6.6 (12/12/2022) 6.4 - 8.3 g/dL Not in Time Range        LDL Calculated (mg/dL)   Date Value   12/12/2022 83           Lab Results   Component Value Date    TSH 1.00 12/12/2022         ASSESSMENT/PLAN:     Diagnoses and all orders for this visit:    Toenail fungus  -     CBC; Future  -     Comprehensive Metabolic Panel; Future  -     CBC; Future    Obsessive thinking  -     Comprehensive Metabolic Panel; Future  -     CBC; Future    Thyroid disorder screen  -     TSH; Future    Vitamin D deficiency  -     Vitamin B12 & Folate; Future  -     Vitamin D 25 Hydroxy; Future    Screening cholesterol level  -     Lipid Panel; Future    Medication management  -     Comprehensive Metabolic Panel; Future  -     CBC; Future    There are no discontinued medications.

## 2022-12-21 ENCOUNTER — OFFICE VISIT (OUTPATIENT)
Dept: PRIMARY CARE CLINIC | Age: 27
End: 2022-12-21

## 2022-12-21 VITALS
SYSTOLIC BLOOD PRESSURE: 137 MMHG | HEART RATE: 98 BPM | DIASTOLIC BLOOD PRESSURE: 88 MMHG | TEMPERATURE: 97.8 F | OXYGEN SATURATION: 98 %

## 2022-12-21 DIAGNOSIS — R14.1 GAS PAIN: ICD-10-CM

## 2022-12-21 DIAGNOSIS — K59.00 CONSTIPATION, UNSPECIFIED CONSTIPATION TYPE: ICD-10-CM

## 2022-12-21 DIAGNOSIS — R10.11 RUQ PAIN: Primary | ICD-10-CM

## 2022-12-21 RX ORDER — SIMETHICONE 80 MG
TABLET,CHEWABLE ORAL
Qty: 30 TABLET | Refills: 0 | Status: SHIPPED | OUTPATIENT
Start: 2022-12-21

## 2022-12-21 NOTE — PROGRESS NOTES
Chief Complaint   Patient presents with    Abdominal Pain     X 3 days patient had stomach pain and today he is having some right side pain.  Neg for diarrhea

## 2022-12-21 NOTE — PROGRESS NOTES
SUBJECTIVE:    Patient ID: Leesa Anderson is a 32 y.o.male. Chief Complaint   Patient presents with    Abdominal Pain     X 3 days patient had stomach pain and today he is having some right side pain. Neg for diarrhea         HPI:    Pt c/o Right sided abdominal pain for 3 days. Worse when lying flat. Has had some constipation. No n/v/d. Body aches and temp 99 yesterday. No heartburn. Not passing a lot of gas. No UTI S&S. No treatments. Patient's medications, allergies, past medical, surgical, social and family histories were reviewed and updated as appropriate in electronic medical record. Outpatient Medications Marked as Taking for the 12/21/22 encounter (Office Visit) with ASH De Los Santos CNP   Medication Sig Dispense Refill    risperiDONE (RISPERDAL) 0.5 MG tablet Take 0.5 mg by mouth 2 times daily      terbinafine (LAMISIL) 250 MG tablet TAKE 1 TABLET BY MOUTH DAILY 42 tablet 0    amphetamine-dextroamphetamine (ADDERALL XR) 20 MG extended release capsule TAKE 1 CAPSULE BY MOUTH IN THE MORNING ONCE A DAY          Review of Systems   Gastrointestinal:  Positive for abdominal pain and constipation. All other systems reviewed and are negative. Past Medical History:   Diagnosis Date    ADHD (attention deficit hyperactivity disorder)     Asthma      History reviewed. No pertinent surgical history. Family History   Problem Relation Age of Onset    Diabetes Paternal Grandfather     No Known Problems Mother     No Known Problems Father       Social History     Tobacco Use   Smoking Status Former   Smokeless Tobacco Never       OBJECTIVE:   Wt Readings from Last 3 Encounters:   12/12/22 280 lb (127 kg)   10/10/22 283 lb (128.4 kg)   03/28/22 287 lb 9.6 oz (130.5 kg)     BP Readings from Last 3 Encounters:   12/21/22 137/88   12/12/22 136/84   10/10/22 122/78       /88   Pulse 98   Temp 97.8 °F (36.6 °C)   SpO2 98%      Physical Exam  Vitals and nursing note reviewed. Constitutional:       Appearance: Normal appearance. HENT:      Head: Normocephalic. Eyes:      Conjunctiva/sclera: Conjunctivae normal.   Cardiovascular:      Rate and Rhythm: Normal rate and regular rhythm. Pulmonary:      Effort: Pulmonary effort is normal.      Breath sounds: Normal breath sounds. Abdominal:      General: Bowel sounds are normal. There is no distension. Palpations: Abdomen is soft. Tenderness: There is no abdominal tenderness. There is no right CVA tenderness, left CVA tenderness, guarding or rebound. Musculoskeletal:      Cervical back: Normal range of motion. Neurological:      General: No focal deficit present. Mental Status: He is alert and oriented to person, place, and time. Psychiatric:         Mood and Affect: Mood normal.         Behavior: Behavior normal.       Lab Results   Component Value Date/Time     12/12/2022 11:50 AM    K 4.0 12/12/2022 11:50 AM     12/12/2022 11:50 AM    CO2 24 12/12/2022 11:50 AM    GLUCOSE 91 12/12/2022 11:50 AM    BUN 11 12/12/2022 11:50 AM    CREATININE 0.9 12/12/2022 11:50 AM    CALCIUM 9.1 12/12/2022 11:50 AM    PROT 6.6 12/12/2022 11:50 AM    LABALBU 4.5 12/12/2022 11:50 AM    BILITOT 0.7 12/12/2022 11:50 AM    ALT 22 12/12/2022 11:50 AM    AST 21 12/12/2022 11:50 AM       LDL Calculated (mg/dL)   Date Value   12/12/2022 83         Lab Results   Component Value Date/Time    WBC 5.2 12/12/2022 11:50 AM    NEUTROABS 2.4 03/13/2020 01:40 PM    HGB 16.7 12/12/2022 11:50 AM    HCT 45.9 12/12/2022 11:50 AM    MCV 88.8 12/12/2022 11:50 AM     12/12/2022 11:50 AM       Lab Results   Component Value Date    TSH 1.00 12/12/2022         ASSESSMENT/PLAN:     1. RUQ pain  No acute findings on exam. Advised if worsening, can obtain US or go to ED. Pt agreeable. - US GALLBLADDER RUQ; Future    2. Gas pain  Suspect gas pain and constipation. Take medications as prescribed. Discussed symptom management.  Return to clinic S&S worsen or no improvement noted. Patient verbalized understanding.    - simethicone (MYLICON) 80 MG chewable tablet; Take 1 tab twice daily 5 days then as needed gas pain  Dispense: 30 tablet; Refill: 0    3. Constipation, unspecified constipation type  Increase fluids. Miralax sample given in clinic and take daily. Education provided. RTC if not better.            Orders Placed This Encounter   Medications    simethicone (MYLICON) 80 MG chewable tablet     Sig: Take 1 tab twice daily 5 days then as needed gas pain     Dispense:  30 tablet     Refill:  0

## 2022-12-21 NOTE — LETTER
[facility]  [address] [address]  Phone: [phone]  Fax: [phone]    ASH Paula CNP        December 21, 2022     Patient: Lynette Acevedo   YOB: 1995   Date of Visit: 12/21/2022       To Whom It May Concern:    Seen in clinic. Please excuse 12/21. If you have any questions or concerns, please don't hesitate to call.     Sincerely,        ASH Paula CNP

## 2023-04-05 ENCOUNTER — OFFICE VISIT (OUTPATIENT)
Dept: PRIMARY CARE CLINIC | Age: 28
End: 2023-04-05
Payer: COMMERCIAL

## 2023-04-05 VITALS
DIASTOLIC BLOOD PRESSURE: 73 MMHG | HEART RATE: 90 BPM | SYSTOLIC BLOOD PRESSURE: 147 MMHG | OXYGEN SATURATION: 97 % | TEMPERATURE: 99.1 F

## 2023-04-05 DIAGNOSIS — K59.00 CONSTIPATION, UNSPECIFIED CONSTIPATION TYPE: ICD-10-CM

## 2023-04-05 DIAGNOSIS — N30.01 ACUTE CYSTITIS WITH HEMATURIA: ICD-10-CM

## 2023-04-05 DIAGNOSIS — R39.11 URINARY HESITANCY: ICD-10-CM

## 2023-04-05 DIAGNOSIS — R11.2 NAUSEA AND VOMITING, UNSPECIFIED VOMITING TYPE: ICD-10-CM

## 2023-04-05 DIAGNOSIS — R10.9 RIGHT SIDED ABDOMINAL PAIN: Primary | ICD-10-CM

## 2023-04-05 DIAGNOSIS — R50.9 FEVER, UNSPECIFIED FEVER CAUSE: ICD-10-CM

## 2023-04-05 LAB
APPEARANCE FLUID: ABNORMAL
BILIRUBIN, POC: ABNORMAL
BLOOD URINE, POC: ABNORMAL
CLARITY, POC: ABNORMAL
COLOR, POC: ABNORMAL
GLUCOSE URINE, POC: ABNORMAL
KETONES, POC: ABNORMAL
LEUKOCYTE EST, POC: ABNORMAL
NITRITE, POC: ABNORMAL
PH, POC: 6
PROTEIN, POC: ABNORMAL
SPECIFIC GRAVITY, POC: 1.02
UROBILINOGEN, POC: 0.2

## 2023-04-05 PROCEDURE — 81002 URINALYSIS NONAUTO W/O SCOPE: CPT | Performed by: NURSE PRACTITIONER

## 2023-04-05 PROCEDURE — 99214 OFFICE O/P EST MOD 30 MIN: CPT | Performed by: NURSE PRACTITIONER

## 2023-04-05 RX ORDER — VENLAFAXINE HYDROCHLORIDE 37.5 MG/1
CAPSULE, EXTENDED RELEASE ORAL
COMMUNITY
Start: 2023-03-22

## 2023-04-05 RX ORDER — SENNA PLUS 8.6 MG/1
1 TABLET ORAL DAILY
Qty: 30 TABLET | Refills: 3 | Status: SHIPPED | OUTPATIENT
Start: 2023-04-05

## 2023-04-05 RX ORDER — CIPROFLOXACIN 500 MG/1
500 TABLET, FILM COATED ORAL 2 TIMES DAILY
Qty: 10 TABLET | Refills: 0 | Status: SHIPPED | OUTPATIENT
Start: 2023-04-05 | End: 2023-04-10

## 2023-04-05 ASSESSMENT — PATIENT HEALTH QUESTIONNAIRE - PHQ9: DEPRESSION UNABLE TO ASSESS: URGENT/EMERGENT SITUATION

## 2023-04-05 NOTE — PROGRESS NOTES
0  - CT ABDOMEN PELVIS WO CONTRAST Additional Contrast? None; Future    5. Acute cystitis with hematuria  - ciprofloxacin (CIPRO) 500 MG tablet; Take 1 tablet by mouth 2 times daily for 5 days  Dispense: 10 tablet; Refill: 0    6.  Nausea and vomiting, unspecified vomiting type  - CT ABDOMEN PELVIS WO CONTRAST Additional Contrast? None; Future        Orders Placed This Encounter   Medications    senna (SENOKOT) 8.6 MG tablet     Sig: Take 1 tablet by mouth daily     Dispense:  30 tablet     Refill:  3    ciprofloxacin (CIPRO) 500 MG tablet     Sig: Take 1 tablet by mouth 2 times daily for 5 days     Dispense:  10 tablet     Refill:  0

## 2023-04-05 NOTE — LETTER
April 5, 2023       Lynette Acevedo YOB: 1995   [address] [facility] Date of Visit:  4/5/2023       To Whom It May Concern:    Seen in clinic. Please excuse 4/5-4/6. If you have any questions or concerns, please don't hesitate to call.     Sincerely,        ASH Paula - CNP

## 2023-04-06 ENCOUNTER — ANESTHESIA EVENT (OUTPATIENT)
Dept: PERIOP | Facility: HOSPITAL | Age: 28
DRG: 331 | End: 2023-04-06
Payer: COMMERCIAL

## 2023-04-06 ENCOUNTER — ANESTHESIA (OUTPATIENT)
Dept: PERIOP | Facility: HOSPITAL | Age: 28
DRG: 331 | End: 2023-04-06
Payer: COMMERCIAL

## 2023-04-06 ENCOUNTER — HOSPITAL ENCOUNTER (OUTPATIENT)
Dept: CT IMAGING | Facility: HOSPITAL | Age: 28
Discharge: HOME | End: 2023-04-06
Payer: COMMERCIAL

## 2023-04-06 ENCOUNTER — HOSPITAL ENCOUNTER (OUTPATIENT)
Dept: ULTRASOUND IMAGING | Facility: HOSPITAL | Age: 28
Discharge: HOME | End: 2023-04-06
Payer: COMMERCIAL

## 2023-04-06 ENCOUNTER — HOSPITAL ENCOUNTER (INPATIENT)
Facility: HOSPITAL | Age: 28
LOS: 4 days | Discharge: HOME | DRG: 331 | End: 2023-04-10
Attending: SURGERY | Admitting: SURGERY
Payer: COMMERCIAL

## 2023-04-06 ENCOUNTER — HOSPITAL ENCOUNTER (OUTPATIENT)
Facility: HOSPITAL | Age: 28
Discharge: HOME | End: 2023-04-06
Payer: COMMERCIAL

## 2023-04-06 DIAGNOSIS — K37 APPENDICITIS: ICD-10-CM

## 2023-04-06 DIAGNOSIS — R11.2 NAUSEA AND VOMITING, UNSPECIFIED VOMITING TYPE: ICD-10-CM

## 2023-04-06 DIAGNOSIS — R10.11 RUQ PAIN: ICD-10-CM

## 2023-04-06 DIAGNOSIS — K35.30 ACUTE APPENDICITIS WITH LOCALIZED PERITONITIS, WITHOUT PERFORATION, ABSCESS, OR GANGRENE: Primary | ICD-10-CM

## 2023-04-06 DIAGNOSIS — R10.9 RIGHT SIDED ABDOMINAL PAIN: ICD-10-CM

## 2023-04-06 DIAGNOSIS — R50.9 FEVER, UNSPECIFIED FEVER CAUSE: ICD-10-CM

## 2023-04-06 DIAGNOSIS — K59.00 CONSTIPATION, UNSPECIFIED CONSTIPATION TYPE: ICD-10-CM

## 2023-04-06 LAB
ANION GAP SERPL CALCULATED.3IONS-SCNC: 9.5 MMOL/L (ref 5–15)
BUN SERPL-MCNC: 10 MG/DL (ref 6–20)
BUN/CREAT SERPL: 10 (ref 7–25)
CALCIUM SPEC-SCNC: 8.9 MG/DL (ref 8.6–10.5)
CHLORIDE SERPL-SCNC: 98 MMOL/L (ref 98–107)
CO2 SERPL-SCNC: 26.5 MMOL/L (ref 22–29)
CREAT SERPL-MCNC: 1 MG/DL (ref 0.76–1.27)
DEPRECATED RDW RBC AUTO: 37.4 FL (ref 37–54)
EGFRCR SERPLBLD CKD-EPI 2021: 104.5 ML/MIN/1.73
ERYTHROCYTE [DISTWIDTH] IN BLOOD BY AUTOMATED COUNT: 11.6 % (ref 12.3–15.4)
GLUCOSE SERPL-MCNC: 91 MG/DL (ref 65–99)
HCT VFR BLD AUTO: 38.2 % (ref 37.5–51)
HGB BLD-MCNC: 13.8 G/DL (ref 13–17.7)
MCH RBC QN AUTO: 31.7 PG (ref 26.6–33)
MCHC RBC AUTO-ENTMCNC: 36.1 G/DL (ref 31.5–35.7)
MCV RBC AUTO: 87.8 FL (ref 79–97)
PLATELET # BLD AUTO: 179 10*3/MM3 (ref 140–450)
PMV BLD AUTO: 9.6 FL (ref 6–12)
POTASSIUM SERPL-SCNC: 4 MMOL/L (ref 3.5–5.2)
RBC # BLD AUTO: 4.35 10*6/MM3 (ref 4.14–5.8)
SODIUM SERPL-SCNC: 134 MMOL/L (ref 136–145)
WBC NRBC COR # BLD: 10.44 10*3/MM3 (ref 3.4–10.8)

## 2023-04-06 PROCEDURE — 85027 COMPLETE CBC AUTOMATED: CPT | Performed by: SURGERY

## 2023-04-06 PROCEDURE — 25010000002 HYDROMORPHONE PER 4 MG: Performed by: NURSE ANESTHETIST, CERTIFIED REGISTERED

## 2023-04-06 PROCEDURE — 80048 BASIC METABOLIC PNL TOTAL CA: CPT | Performed by: SURGERY

## 2023-04-06 PROCEDURE — 25010000002 ONDANSETRON PER 1 MG: Performed by: NURSE ANESTHETIST, CERTIFIED REGISTERED

## 2023-04-06 PROCEDURE — 74176 CT ABD & PELVIS W/O CONTRAST: CPT

## 2023-04-06 PROCEDURE — 44204 LAPARO PARTIAL COLECTOMY: CPT | Performed by: SURGERY

## 2023-04-06 PROCEDURE — 0DTF4ZZ RESECTION OF RIGHT LARGE INTESTINE, PERCUTANEOUS ENDOSCOPIC APPROACH: ICD-10-PCS | Performed by: SURGERY

## 2023-04-06 PROCEDURE — 0DTJ4ZZ RESECTION OF APPENDIX, PERCUTANEOUS ENDOSCOPIC APPROACH: ICD-10-PCS | Performed by: SURGERY

## 2023-04-06 PROCEDURE — 25010000002 PROPOFOL 200 MG/20ML EMULSION: Performed by: NURSE ANESTHETIST, CERTIFIED REGISTERED

## 2023-04-06 PROCEDURE — 25010000002 SUCCINYLCHOLINE PER 20 MG: Performed by: NURSE ANESTHETIST, CERTIFIED REGISTERED

## 2023-04-06 PROCEDURE — 99235 HOSP IP/OBS SAME DATE MOD 70: CPT | Performed by: SURGERY

## 2023-04-06 PROCEDURE — 25010000002 PROCHLORPERAZINE 10 MG/2ML SOLUTION: Performed by: NURSE ANESTHETIST, CERTIFIED REGISTERED

## 2023-04-06 PROCEDURE — 76705 ECHO EXAM OF ABDOMEN: CPT

## 2023-04-06 PROCEDURE — 0 AMPICILLIN-SULBACTAM PER 1.5 G: Performed by: SURGERY

## 2023-04-06 PROCEDURE — 25010000002 AMPICILLIN-SULBACTAM PER 1.5 G: Performed by: NURSE ANESTHETIST, CERTIFIED REGISTERED

## 2023-04-06 PROCEDURE — 25010000002 DEXAMETHASONE PER 1 MG: Performed by: NURSE ANESTHETIST, CERTIFIED REGISTERED

## 2023-04-06 PROCEDURE — 25010000002 FENTANYL CITRATE (PF) 100 MCG/2ML SOLUTION: Performed by: NURSE ANESTHETIST, CERTIFIED REGISTERED

## 2023-04-06 DEVICE — PROXIMATE LINEAR CUTTER RELOAD, BLUE, 75MM
Type: IMPLANTABLE DEVICE | Site: ABDOMEN | Status: FUNCTIONAL
Brand: PROXIMATE

## 2023-04-06 DEVICE — PROXIMATE RELOADABLE LINEAR CUTTER WITH SAFETY LOCK-OUT, 75MM
Type: IMPLANTABLE DEVICE | Site: ABDOMEN | Status: FUNCTIONAL
Brand: PROXIMATE

## 2023-04-06 DEVICE — PROXIMATE RELOADABLE LINEAR STAPLER
Type: IMPLANTABLE DEVICE | Site: ABDOMEN | Status: FUNCTIONAL
Brand: PROXIMATE

## 2023-04-06 RX ORDER — LORAZEPAM 2 MG/ML
1 INJECTION INTRAMUSCULAR
Status: DISCONTINUED | OUTPATIENT
Start: 2023-04-06 | End: 2023-04-06 | Stop reason: HOSPADM

## 2023-04-06 RX ORDER — RISPERIDONE 0.5 MG/1
0.5 TABLET ORAL 2 TIMES DAILY
COMMUNITY

## 2023-04-06 RX ORDER — DEXTROSE, SODIUM CHLORIDE, SODIUM LACTATE, POTASSIUM CHLORIDE, AND CALCIUM CHLORIDE 5; .6; .31; .03; .02 G/100ML; G/100ML; G/100ML; G/100ML; G/100ML
125 INJECTION, SOLUTION INTRAVENOUS CONTINUOUS
Status: DISCONTINUED | OUTPATIENT
Start: 2023-04-06 | End: 2023-04-10 | Stop reason: HOSPADM

## 2023-04-06 RX ORDER — MEPERIDINE HYDROCHLORIDE 25 MG/ML
12.5 INJECTION INTRAMUSCULAR; INTRAVENOUS; SUBCUTANEOUS
Status: DISCONTINUED | OUTPATIENT
Start: 2023-04-06 | End: 2023-04-06 | Stop reason: HOSPADM

## 2023-04-06 RX ORDER — SUCCINYLCHOLINE CHLORIDE 20 MG/ML
INJECTION INTRAMUSCULAR; INTRAVENOUS AS NEEDED
Status: DISCONTINUED | OUTPATIENT
Start: 2023-04-06 | End: 2023-04-06 | Stop reason: SURG

## 2023-04-06 RX ORDER — SODIUM CHLORIDE, SODIUM LACTATE, POTASSIUM CHLORIDE, CALCIUM CHLORIDE 600; 310; 30; 20 MG/100ML; MG/100ML; MG/100ML; MG/100ML
1000 INJECTION, SOLUTION INTRAVENOUS CONTINUOUS
Status: ACTIVE | OUTPATIENT
Start: 2023-04-06 | End: 2023-04-08

## 2023-04-06 RX ORDER — NALOXONE HCL 0.4 MG/ML
0.4 VIAL (ML) INJECTION
Status: DISCONTINUED | OUTPATIENT
Start: 2023-04-06 | End: 2023-04-10 | Stop reason: HOSPADM

## 2023-04-06 RX ORDER — MORPHINE SULFATE 4 MG/ML
4 INJECTION, SOLUTION INTRAMUSCULAR; INTRAVENOUS
Status: DISCONTINUED | OUTPATIENT
Start: 2023-04-06 | End: 2023-04-10 | Stop reason: HOSPADM

## 2023-04-06 RX ORDER — LIDOCAINE HYDROCHLORIDE 20 MG/ML
INJECTION, SOLUTION INTRAVENOUS AS NEEDED
Status: DISCONTINUED | OUTPATIENT
Start: 2023-04-06 | End: 2023-04-06 | Stop reason: SURG

## 2023-04-06 RX ORDER — SODIUM CHLORIDE 0.9 % (FLUSH) 0.9 %
10 SYRINGE (ML) INJECTION AS NEEDED
Status: DISCONTINUED | OUTPATIENT
Start: 2023-04-06 | End: 2023-04-06 | Stop reason: HOSPADM

## 2023-04-06 RX ORDER — MAGNESIUM HYDROXIDE 1200 MG/15ML
LIQUID ORAL AS NEEDED
Status: DISCONTINUED | OUTPATIENT
Start: 2023-04-06 | End: 2023-04-06 | Stop reason: HOSPADM

## 2023-04-06 RX ORDER — ONDANSETRON 2 MG/ML
4 INJECTION INTRAMUSCULAR; INTRAVENOUS EVERY 8 HOURS PRN
Status: DISCONTINUED | OUTPATIENT
Start: 2023-04-06 | End: 2023-04-10 | Stop reason: HOSPADM

## 2023-04-06 RX ORDER — LABETALOL HYDROCHLORIDE 5 MG/ML
INJECTION, SOLUTION INTRAVENOUS AS NEEDED
Status: DISCONTINUED | OUTPATIENT
Start: 2023-04-06 | End: 2023-04-06 | Stop reason: SURG

## 2023-04-06 RX ORDER — BUPIVACAINE HYDROCHLORIDE AND EPINEPHRINE 2.5; 5 MG/ML; UG/ML
INJECTION, SOLUTION EPIDURAL; INFILTRATION; INTRACAUDAL; PERINEURAL AS NEEDED
Status: DISCONTINUED | OUTPATIENT
Start: 2023-04-06 | End: 2023-04-06 | Stop reason: HOSPADM

## 2023-04-06 RX ORDER — ACETAMINOPHEN 325 MG/1
650 TABLET ORAL EVERY 4 HOURS PRN
Status: DISCONTINUED | OUTPATIENT
Start: 2023-04-06 | End: 2023-04-10 | Stop reason: HOSPADM

## 2023-04-06 RX ORDER — PANTOPRAZOLE SODIUM 40 MG/10ML
40 INJECTION, POWDER, LYOPHILIZED, FOR SOLUTION INTRAVENOUS
Status: DISCONTINUED | OUTPATIENT
Start: 2023-04-07 | End: 2023-04-10 | Stop reason: HOSPADM

## 2023-04-06 RX ORDER — FENTANYL CITRATE 50 UG/ML
INJECTION, SOLUTION INTRAMUSCULAR; INTRAVENOUS AS NEEDED
Status: DISCONTINUED | OUTPATIENT
Start: 2023-04-06 | End: 2023-04-06 | Stop reason: SURG

## 2023-04-06 RX ORDER — DEXTROAMPHETAMINE SACCHARATE, AMPHETAMINE ASPARTATE MONOHYDRATE, DEXTROAMPHETAMINE SULFATE AND AMPHETAMINE SULFATE 2.5; 2.5; 2.5; 2.5 MG/1; MG/1; MG/1; MG/1
10 CAPSULE, EXTENDED RELEASE ORAL EVERY MORNING
COMMUNITY
End: 2023-04-10 | Stop reason: HOSPADM

## 2023-04-06 RX ORDER — ENOXAPARIN SODIUM 100 MG/ML
40 INJECTION SUBCUTANEOUS DAILY
Status: DISCONTINUED | OUTPATIENT
Start: 2023-04-07 | End: 2023-04-10 | Stop reason: HOSPADM

## 2023-04-06 RX ORDER — HYDROCODONE BITARTRATE AND ACETAMINOPHEN 5; 325 MG/1; MG/1
1 TABLET ORAL EVERY 4 HOURS PRN
Status: DISCONTINUED | OUTPATIENT
Start: 2023-04-06 | End: 2023-04-10 | Stop reason: HOSPADM

## 2023-04-06 RX ORDER — ROCURONIUM BROMIDE 10 MG/ML
INJECTION, SOLUTION INTRAVENOUS AS NEEDED
Status: DISCONTINUED | OUTPATIENT
Start: 2023-04-06 | End: 2023-04-06 | Stop reason: SURG

## 2023-04-06 RX ORDER — HYDROMORPHONE HCL 110MG/55ML
PATIENT CONTROLLED ANALGESIA SYRINGE INTRAVENOUS AS NEEDED
Status: DISCONTINUED | OUTPATIENT
Start: 2023-04-06 | End: 2023-04-06 | Stop reason: SURG

## 2023-04-06 RX ORDER — HYDROCODONE BITARTRATE AND ACETAMINOPHEN 5; 325 MG/1; MG/1
1-2 TABLET ORAL EVERY 4 HOURS PRN
Qty: 10 TABLET | Refills: 0 | Status: SHIPPED | OUTPATIENT
Start: 2023-04-06 | End: 2023-04-08

## 2023-04-06 RX ORDER — ONDANSETRON 4 MG/1
4 TABLET, FILM COATED ORAL EVERY 8 HOURS PRN
Qty: 6 TABLET | Refills: 1 | Status: SHIPPED | OUTPATIENT
Start: 2023-04-06 | End: 2023-04-09

## 2023-04-06 RX ORDER — DEXTROAMPHETAMINE SACCHARATE, AMPHETAMINE ASPARTATE MONOHYDRATE, DEXTROAMPHETAMINE SULFATE AND AMPHETAMINE SULFATE 2.5; 2.5; 2.5; 2.5 MG/1; MG/1; MG/1; MG/1
10 CAPSULE, EXTENDED RELEASE ORAL DAILY
Status: DISCONTINUED | OUTPATIENT
Start: 2023-04-07 | End: 2023-04-10 | Stop reason: HOSPADM

## 2023-04-06 RX ORDER — NEOSTIGMINE METHYLSULFATE 5 MG/5 ML
SYRINGE (ML) INTRAVENOUS AS NEEDED
Status: DISCONTINUED | OUTPATIENT
Start: 2023-04-06 | End: 2023-04-06 | Stop reason: SURG

## 2023-04-06 RX ORDER — PROPOFOL 10 MG/ML
INJECTION, EMULSION INTRAVENOUS AS NEEDED
Status: DISCONTINUED | OUTPATIENT
Start: 2023-04-06 | End: 2023-04-06 | Stop reason: SURG

## 2023-04-06 RX ORDER — AMPICILLIN AND SULBACTAM 2; 1 G/1; G/1
INJECTION, POWDER, FOR SOLUTION INTRAMUSCULAR; INTRAVENOUS AS NEEDED
Status: DISCONTINUED | OUTPATIENT
Start: 2023-04-06 | End: 2023-04-06 | Stop reason: SURG

## 2023-04-06 RX ORDER — HYDROCODONE BITARTRATE AND ACETAMINOPHEN 10; 325 MG/1; MG/1
1 TABLET ORAL EVERY 4 HOURS PRN
Status: DISCONTINUED | OUTPATIENT
Start: 2023-04-06 | End: 2023-04-10 | Stop reason: HOSPADM

## 2023-04-06 RX ORDER — IBUPROFEN 800 MG/1
800 TABLET ORAL EVERY 6 HOURS PRN
Qty: 16 TABLET | Refills: 0 | Status: SHIPPED | OUTPATIENT
Start: 2023-04-06 | End: 2023-04-11

## 2023-04-06 RX ORDER — PROCHLORPERAZINE EDISYLATE 5 MG/ML
10 INJECTION INTRAMUSCULAR; INTRAVENOUS ONCE AS NEEDED
Status: COMPLETED | OUTPATIENT
Start: 2023-04-06 | End: 2023-04-06

## 2023-04-06 RX ORDER — DEXTROAMPHETAMINE SACCHARATE, AMPHETAMINE ASPARTATE, DEXTROAMPHETAMINE SULFATE AND AMPHETAMINE SULFATE 5; 5; 5; 5 MG/1; MG/1; MG/1; MG/1
20 TABLET ORAL DAILY
COMMUNITY

## 2023-04-06 RX ORDER — DEXTROAMPHETAMINE SACCHARATE, AMPHETAMINE ASPARTATE, DEXTROAMPHETAMINE SULFATE AND AMPHETAMINE SULFATE 5; 5; 5; 5 MG/1; MG/1; MG/1; MG/1
20 TABLET ORAL DAILY
Status: DISCONTINUED | OUTPATIENT
Start: 2023-04-07 | End: 2023-04-10 | Stop reason: HOSPADM

## 2023-04-06 RX ORDER — MORPHINE SULFATE 2 MG/ML
2 INJECTION, SOLUTION INTRAMUSCULAR; INTRAVENOUS
Status: DISCONTINUED | OUTPATIENT
Start: 2023-04-06 | End: 2023-04-10 | Stop reason: HOSPADM

## 2023-04-06 RX ORDER — ONDANSETRON 2 MG/ML
INJECTION INTRAMUSCULAR; INTRAVENOUS AS NEEDED
Status: DISCONTINUED | OUTPATIENT
Start: 2023-04-06 | End: 2023-04-06 | Stop reason: SURG

## 2023-04-06 RX ORDER — RISPERIDONE 1 MG/1
1 TABLET ORAL 2 TIMES DAILY
Status: DISCONTINUED | OUTPATIENT
Start: 2023-04-06 | End: 2023-04-10 | Stop reason: HOSPADM

## 2023-04-06 RX ORDER — DEXAMETHASONE SODIUM PHOSPHATE 4 MG/ML
INJECTION, SOLUTION INTRA-ARTICULAR; INTRALESIONAL; INTRAMUSCULAR; INTRAVENOUS; SOFT TISSUE AS NEEDED
Status: DISCONTINUED | OUTPATIENT
Start: 2023-04-06 | End: 2023-04-06 | Stop reason: SURG

## 2023-04-06 RX ADMIN — SUCCINYLCHOLINE CHLORIDE 200 MG: 20 INJECTION, SOLUTION INTRAMUSCULAR; INTRAVENOUS at 19:48

## 2023-04-06 RX ADMIN — SODIUM CHLORIDE, POTASSIUM CHLORIDE, SODIUM LACTATE AND CALCIUM CHLORIDE 1000 ML: 600; 310; 30; 20 INJECTION, SOLUTION INTRAVENOUS at 17:01

## 2023-04-06 RX ADMIN — HYDROMORPHONE HYDROCHLORIDE 0.5 MG: 2 INJECTION, SOLUTION INTRAMUSCULAR; INTRAVENOUS; SUBCUTANEOUS at 20:10

## 2023-04-06 RX ADMIN — LABETALOL HYDROCHLORIDE 10 MG: 5 INJECTION, SOLUTION INTRAVENOUS at 20:30

## 2023-04-06 RX ADMIN — FENTANYL CITRATE 100 MCG: 50 INJECTION INTRAMUSCULAR; INTRAVENOUS at 19:48

## 2023-04-06 RX ADMIN — AMPICILLIN SODIUM AND SULBACTAM SODIUM 3 G: 2; 1 INJECTION, POWDER, FOR SOLUTION INTRAMUSCULAR; INTRAVENOUS at 19:50

## 2023-04-06 RX ADMIN — RISPERIDONE 1 MG: 1 TABLET ORAL at 23:55

## 2023-04-06 RX ADMIN — SODIUM CHLORIDE 3 G: 900 INJECTION, SOLUTION INTRAVENOUS at 17:01

## 2023-04-06 RX ADMIN — PROCHLORPERAZINE EDISYLATE 10 MG: 5 INJECTION INTRAMUSCULAR; INTRAVENOUS at 22:37

## 2023-04-06 RX ADMIN — HYDROMORPHONE HYDROCHLORIDE 0.5 MG: 2 INJECTION, SOLUTION INTRAMUSCULAR; INTRAVENOUS; SUBCUTANEOUS at 21:05

## 2023-04-06 RX ADMIN — ROCURONIUM BROMIDE 30 MG: 10 INJECTION INTRAVENOUS at 19:48

## 2023-04-06 RX ADMIN — PROPOFOL 200 MG: 10 INJECTION, EMULSION INTRAVENOUS at 19:48

## 2023-04-06 RX ADMIN — HYDROMORPHONE HYDROCHLORIDE 0.5 MG: 2 INJECTION, SOLUTION INTRAMUSCULAR; INTRAVENOUS; SUBCUTANEOUS at 21:58

## 2023-04-06 RX ADMIN — SODIUM CHLORIDE, SODIUM LACTATE, POTASSIUM CHLORIDE, CALCIUM CHLORIDE AND DEXTROSE MONOHYDRATE 125 ML/HR: 5; 600; 310; 30; 20 INJECTION, SOLUTION INTRAVENOUS at 23:55

## 2023-04-06 RX ADMIN — ROCURONIUM BROMIDE 20 MG: 10 INJECTION INTRAVENOUS at 20:10

## 2023-04-06 RX ADMIN — DEXAMETHASONE SODIUM PHOSPHATE 4 MG: 4 INJECTION, SOLUTION INTRAMUSCULAR; INTRAVENOUS at 19:48

## 2023-04-06 RX ADMIN — FENTANYL CITRATE 100 MCG: 50 INJECTION INTRAMUSCULAR; INTRAVENOUS at 20:02

## 2023-04-06 RX ADMIN — GLYCOPYRROLATE 0.4 MG: 0.2 INJECTION, SOLUTION INTRAMUSCULAR; INTRAVENOUS at 21:54

## 2023-04-06 RX ADMIN — LIDOCAINE HYDROCHLORIDE 60 MG: 20 INJECTION, SOLUTION INTRAVENOUS at 19:48

## 2023-04-06 RX ADMIN — ONDANSETRON 4 MG: 2 INJECTION INTRAMUSCULAR; INTRAVENOUS at 19:48

## 2023-04-06 RX ADMIN — SODIUM CHLORIDE, POTASSIUM CHLORIDE, SODIUM LACTATE AND CALCIUM CHLORIDE: 600; 310; 30; 20 INJECTION, SOLUTION INTRAVENOUS at 21:44

## 2023-04-06 RX ADMIN — HYDROMORPHONE HYDROCHLORIDE 0.5 MG: 2 INJECTION, SOLUTION INTRAMUSCULAR; INTRAVENOUS; SUBCUTANEOUS at 20:15

## 2023-04-06 RX ADMIN — Medication 4 MG: at 21:54

## 2023-04-06 ASSESSMENT — ENCOUNTER SYMPTOMS
ANAL BLEEDING: 0
ABDOMINAL PAIN: 1
ABDOMINAL DISTENTION: 0
CONSTIPATION: 1
DIARRHEA: 0
VOMITING: 0
NAUSEA: 0

## 2023-04-06 NOTE — H&P
[facility]   HISTORY AND PHYSICAL      Name:  Prashanth Freeman   Age:  27 y.o.  Sex:  male  :  1995  MRN:  [id number]   Visit Number:  [id number]  Admission Date:  2023  Date Of Service:  23  Primary Care Physician:  Lila Boswell APRN    Chief Complaint:     I have abdominal pain    History Of Presenting Illness:      Patient with abdominal pain for approximately 1 week.  He noted that he had this similar pain about a month ago.  It has progressed in that time.  Some nausea without vomiting.  He points to the right mid and lower quadrant    Review Of Systems:     General ROS: Patient denies any fevers, chills or loss of consciousness.  No complaints of generalized weakness  Psychological ROS: Denies any hallucinations and delusions.  Ophthalmic ROS: no transient loss of vision.  ENT ROS: Denies sore throat, nasal congestion or ear pain.   Allergy and Immunology ROS: Denies rash or itching.  Hematological and Lymphatic ROS: Denies neck swelling or easy bleeding.  Respiratory ROS: Denies cough or shortness of breath.   Cardiovascular ROS: Denies chest pain or palpitations. No history of exertional chest pain.   Gastrointestinal ROS: As per history of present illness  Genito-Urinary ROS: Denies dysuria or hematuria.  Musculoskeletal ROS: no back pain. No muscle pain. No calf pain.   Neurological ROS: Denies any focal weakness. No loss of consciousness. Denies any numbness.   Dermatological ROS: Denies any redness or pruritis.     Past Medical History:    History reviewed. No pertinent past medical history.    Past Surgical history:    History reviewed. No pertinent surgical history.    Social History:    Social History     Socioeconomic History   • Marital status:        Family History:    History reviewed. No pertinent family history.    Allergies:      Patient has no known allergies.    Home Medications:    Prior to Admission Medications     Prescriptions Last  Dose Informant Patient Reported? Taking?    amphetamine-dextroamphetamine (ADDERALL) 20 MG tablet 4/3/2023 Self Yes No    Take 1 tablet by mouth Daily.    amphetamine-dextroamphetamine XR (ADDERALL XR) 10 MG 24 hr capsule 4/3/2023 Self Yes No    Take 1 capsule by mouth Every Morning    risperiDONE (risperDAL) 1 MG tablet  Self Yes No    Take 1 tablet by mouth 2 (Two) Times a Day. Pt dont remember the dose             ED Medications:    Medications   sodium chloride 0.9 % flush 10 mL (has no administration in time range)   lactated ringers infusion 1,000 mL (has no administration in time range)       Vital Signs:    Temp:  [98.4 °F (36.9 °C)] 98.4 °F (36.9 °C)  Heart Rate:  [96] 96  Resp:  [17] 17  BP: (127)/(79) 127/79        04/06/23  1556   Weight: 136 kg (300 lb)       Body mass index is 41.84 kg/m².    Physical Exam:      General Appearance:  Alert and cooperative, not in any acute distress.   Head:  Atraumatic and normocephalic, without obvious abnormality.   Eyes:          PERRLA, conjunctivae and sclerae normal, no Icterus. No pallor. Extra-occular movements are within normal limits.   Ears:  Ears appear intact with no abnormalities noted.   Throat: No oral lesions, no thrush, oral mucosa moist.   Respiratory/Lungs:   Breath sounds heard bilaterally equally.  No crackles or wheezing. No Pleural rub or bronchial breathing. Normal respiratory effort.    Cardiovascular/Heart:  Normal S1 and S2, no murmur. No edema   GI/Abdomen:   No masses, no hepatosplenomegaly. Soft,  non-distended, Tender with localized guarding in the right lower quadrant.     Musculoskeletal/ Extremities:   Moves all extremities well   Skin: No bleeding, bruising or rash, no induration   Psychiatric : Alert and oriented ×3.  No depression or anxiety    Neurologic: Cranial nerves 2 - 12 grossly intact, sensation intact, Motor power is normal and equal bilaterally.       EKG:          Labs:    Lab Results (last 24 hours)     ** No results  found for the last 24 hours. **          Radiology:    Imaging Results (Last 72 Hours)     ** No results found for the last 72 hours. **          Assessment:    Acute appendicitis based on CT scan evaluation performed today.    Plan:     I recommend a laparoscopic appendectomy to the patient.  The patient understands this procedure as well as the possibility of converting to an open procedure.  As well, the patient understands the risks which include but are not limited to bleeding, infection including abscess, bowel injury, cardiopulmonary risks etc. The patient understands    all of the above and wishes to proceed.    Hasmukh Mckeon MD  04/06/23  16:29 EDT  .

## 2023-04-06 NOTE — ANESTHESIA PREPROCEDURE EVALUATION
Anesthesia Evaluation     Patient summary reviewed and Nursing notes reviewed   NPO Solid Status: > 8 hours  NPO Liquid Status: > 8 hours           Airway   Mallampati: II  TM distance: >3 FB  Neck ROM: full  Possible difficult intubation and Large neck circumference  Dental - normal exam     Pulmonary - normal exam   Cardiovascular - normal exam        Neuro/Psych  GI/Hepatic/Renal/Endo    (+) morbid obesity,      Musculoskeletal     Abdominal  - normal exam   Substance History      OB/GYN          Other                        Anesthesia Plan    ASA 3 - emergent     general     intravenous induction     Anesthetic plan, risks, benefits, and alternatives have been provided, discussed and informed consent has been obtained with: patient.  Pre-procedure education provided  Plan discussed with CRNA.        CODE STATUS:

## 2023-04-06 NOTE — NURSING NOTE
I called Kylie Bobo RN in the OR and had her tell  about the rash around the patients belly at the umbilicus and under the belly. Pt has a blotchie face and rash on his upper arms.

## 2023-04-06 NOTE — ANESTHESIA PROCEDURE NOTES
Airway  Urgency: elective    Date/Time: 4/6/2023 7:47 PM  Airway not difficult    General Information and Staff    Patient location during procedure: OR  CRNA/CAA: Hardik Hawkins CRNA    Indications and Patient Condition  Indications for airway management: airway protection    Preoxygenated: yes  Mask difficulty assessment: 1 - vent by mask    Final Airway Details  Final airway type: endotracheal airway      Successful airway: ETT  Cuffed: yes   Successful intubation technique: direct laryngoscopy  Facilitating devices/methods: intubating stylet  Endotracheal tube insertion site: oral  Blade: Weldon  Blade size: 2  ETT size (mm): 7.5  Cormack-Lehane Classification: grade I - full view of glottis  Placement verified by: chest auscultation and capnometry   Cuff volume (mL): 10  Measured from: lips  ETT/EBT  to lips (cm): 22  Number of attempts at approach: 1  Assessment: lips, teeth, and gum same as pre-op and atraumatic intubation    Additional Comments  Airway placed without problems. Dentition and Lips as pre-induction. ETT cuff inflated to minimal occlusive pressure.

## 2023-04-06 NOTE — DISCHARGE INSTRUCTIONS
No pushing, pulling, tugging,  heavy lifting, or strenuous activity.  No major decision making, driving, or drinking alcoholic beverages for 24 hours. ( due to the medications you have  received)  Always use good hand hygiene/washing techniques.  NO driving while taking pain medications.    * if you have an incision:  Check your incision area every day for signs of infection.   Check for:  * more redness, swelling, or pain  *more fluid or blood  *warmth  *pus or bad smell To assist you in voiding:  Drink plenty of fluids  Listen to running water while attempting to void.    If you are unable to urinate and you have an uncomfortable urge to void or it has been   6 hours since you were discharged, return to the Emergency Room Please follow all post op instructions and follow up appointment time from your physician's office included in your discharge packet.  .

## 2023-04-07 LAB
ANION GAP SERPL CALCULATED.3IONS-SCNC: 11.4 MMOL/L (ref 5–15)
BASOPHILS # BLD AUTO: 0.03 10*3/MM3 (ref 0–0.2)
BASOPHILS NFR BLD AUTO: 0.3 % (ref 0–1.5)
BUN SERPL-MCNC: 13 MG/DL (ref 6–20)
BUN/CREAT SERPL: 11.4 (ref 7–25)
CALCIUM SPEC-SCNC: 8.5 MG/DL (ref 8.6–10.5)
CHLORIDE SERPL-SCNC: 99 MMOL/L (ref 98–107)
CO2 SERPL-SCNC: 24.6 MMOL/L (ref 22–29)
CREAT SERPL-MCNC: 1.14 MG/DL (ref 0.76–1.27)
DEPRECATED RDW RBC AUTO: 37.8 FL (ref 37–54)
EGFRCR SERPLBLD CKD-EPI 2021: 89.3 ML/MIN/1.73
EOSINOPHIL # BLD AUTO: 0.01 10*3/MM3 (ref 0–0.4)
EOSINOPHIL NFR BLD AUTO: 0.1 % (ref 0.3–6.2)
ERYTHROCYTE [DISTWIDTH] IN BLOOD BY AUTOMATED COUNT: 11.8 % (ref 12.3–15.4)
GLUCOSE SERPL-MCNC: 143 MG/DL (ref 65–99)
HCT VFR BLD AUTO: 38.8 % (ref 37.5–51)
HGB BLD-MCNC: 14.2 G/DL (ref 13–17.7)
IMM GRANULOCYTES # BLD AUTO: 0.05 10*3/MM3 (ref 0–0.05)
IMM GRANULOCYTES NFR BLD AUTO: 0.5 % (ref 0–0.5)
LYMPHOCYTES # BLD AUTO: 0.63 10*3/MM3 (ref 0.7–3.1)
LYMPHOCYTES NFR BLD AUTO: 6.4 % (ref 19.6–45.3)
MCH RBC QN AUTO: 32.6 PG (ref 26.6–33)
MCHC RBC AUTO-ENTMCNC: 36.6 G/DL (ref 31.5–35.7)
MCV RBC AUTO: 89.2 FL (ref 79–97)
MONOCYTES # BLD AUTO: 1.08 10*3/MM3 (ref 0.1–0.9)
MONOCYTES NFR BLD AUTO: 11 % (ref 5–12)
NEUTROPHILS NFR BLD AUTO: 7.99 10*3/MM3 (ref 1.7–7)
NEUTROPHILS NFR BLD AUTO: 81.7 % (ref 42.7–76)
NRBC BLD AUTO-RTO: 0 /100 WBC (ref 0–0.2)
PLATELET # BLD AUTO: 184 10*3/MM3 (ref 140–450)
PMV BLD AUTO: 9.9 FL (ref 6–12)
POTASSIUM SERPL-SCNC: 4.3 MMOL/L (ref 3.5–5.2)
RBC # BLD AUTO: 4.35 10*6/MM3 (ref 4.14–5.8)
SODIUM SERPL-SCNC: 135 MMOL/L (ref 136–145)
WBC NRBC COR # BLD: 9.79 10*3/MM3 (ref 3.4–10.8)

## 2023-04-07 PROCEDURE — 85025 COMPLETE CBC W/AUTO DIFF WBC: CPT | Performed by: SURGERY

## 2023-04-07 PROCEDURE — 25010000002 ONDANSETRON PER 1 MG: Performed by: SURGERY

## 2023-04-07 PROCEDURE — 25010000002 MORPHINE PER 10 MG: Performed by: SURGERY

## 2023-04-07 PROCEDURE — 80048 BASIC METABOLIC PNL TOTAL CA: CPT | Performed by: SURGERY

## 2023-04-07 PROCEDURE — 99024 POSTOP FOLLOW-UP VISIT: CPT | Performed by: SURGERY

## 2023-04-07 PROCEDURE — 25010000002 ENOXAPARIN PER 10 MG: Performed by: SURGERY

## 2023-04-07 RX ORDER — MULTIVIT-MIN/IRON/FOLIC ACID/K 18-600-40
2000 CAPSULE ORAL DAILY
COMMUNITY

## 2023-04-07 RX ORDER — VENLAFAXINE 37.5 MG/1
37.5 TABLET ORAL DAILY
COMMUNITY

## 2023-04-07 RX ADMIN — SODIUM CHLORIDE, SODIUM LACTATE, POTASSIUM CHLORIDE, CALCIUM CHLORIDE AND DEXTROSE MONOHYDRATE 125 ML/HR: 5; 600; 310; 30; 20 INJECTION, SOLUTION INTRAVENOUS at 06:20

## 2023-04-07 RX ADMIN — MORPHINE SULFATE 2 MG: 2 INJECTION, SOLUTION INTRAMUSCULAR; INTRAVENOUS at 16:56

## 2023-04-07 RX ADMIN — HYDROCODONE BITARTRATE AND ACETAMINOPHEN 1 TABLET: 10; 325 TABLET ORAL at 14:11

## 2023-04-07 RX ADMIN — SODIUM CHLORIDE, SODIUM LACTATE, POTASSIUM CHLORIDE, CALCIUM CHLORIDE AND DEXTROSE MONOHYDRATE 125 ML/HR: 5; 600; 310; 30; 20 INJECTION, SOLUTION INTRAVENOUS at 21:55

## 2023-04-07 RX ADMIN — ACETAMINOPHEN 650 MG: 325 TABLET, FILM COATED ORAL at 21:55

## 2023-04-07 RX ADMIN — MORPHINE SULFATE 4 MG: 4 INJECTION, SOLUTION INTRAMUSCULAR; INTRAVENOUS at 06:15

## 2023-04-07 RX ADMIN — PANTOPRAZOLE SODIUM 40 MG: 40 INJECTION, POWDER, FOR SOLUTION INTRAVENOUS at 06:15

## 2023-04-07 RX ADMIN — ONDANSETRON 4 MG: 2 SOLUTION INTRAMUSCULAR; INTRAVENOUS at 23:40

## 2023-04-07 RX ADMIN — HYDROCODONE BITARTRATE AND ACETAMINOPHEN 1 TABLET: 10; 325 TABLET ORAL at 18:00

## 2023-04-07 RX ADMIN — RISPERIDONE 1 MG: 1 TABLET ORAL at 20:30

## 2023-04-07 RX ADMIN — RISPERIDONE 1 MG: 1 TABLET ORAL at 09:10

## 2023-04-07 RX ADMIN — MORPHINE SULFATE 2 MG: 2 INJECTION, SOLUTION INTRAMUSCULAR; INTRAVENOUS at 03:40

## 2023-04-07 RX ADMIN — MORPHINE SULFATE 2 MG: 2 INJECTION, SOLUTION INTRAMUSCULAR; INTRAVENOUS at 03:05

## 2023-04-07 RX ADMIN — HYDROCODONE BITARTRATE AND ACETAMINOPHEN 1 TABLET: 10; 325 TABLET ORAL at 09:10

## 2023-04-07 RX ADMIN — ONDANSETRON 4 MG: 2 SOLUTION INTRAMUSCULAR; INTRAVENOUS at 09:14

## 2023-04-07 RX ADMIN — SODIUM CHLORIDE, SODIUM LACTATE, POTASSIUM CHLORIDE, CALCIUM CHLORIDE AND DEXTROSE MONOHYDRATE 125 ML/HR: 5; 600; 310; 30; 20 INJECTION, SOLUTION INTRAVENOUS at 14:29

## 2023-04-07 RX ADMIN — MORPHINE SULFATE 4 MG: 4 INJECTION, SOLUTION INTRAMUSCULAR; INTRAVENOUS at 20:45

## 2023-04-07 RX ADMIN — ENOXAPARIN SODIUM 40 MG: 100 INJECTION SUBCUTANEOUS at 06:40

## 2023-04-07 NOTE — PLAN OF CARE
Goal Outcome Evaluation:  Plan of Care Reviewed With: patient        Progress: no change  Outcome Evaluation: new admit from post op surgery-patient's wife at bedside-IV fluids infusing-will continue to monitor

## 2023-04-07 NOTE — ANESTHESIA POSTPROCEDURE EVALUATION
Patient: Prashanth Freeman    Procedure Summary     Date: 04/06/23 Room / Location: [facility] OR  /  [facility]    Anesthesia Start: 1945 Anesthesia Stop: 2217    Procedure: HAND ASSISTED APPENDECTOMY WITH RIGHT COLON RESECTION (Abdomen) Diagnosis:     Surgeons: Hasmukh Mckeon MD Provider: Hardik Hawkins CRNA    Anesthesia Type: general ASA Status: 3 - Emergent          Anesthesia Type: general    Vitals  Vitals Value Taken Time   /75 04/06/23 2215   Temp     Pulse 72 04/06/23 2218   Resp     SpO2 99 % 04/06/23 2218   Vitals shown include unvalidated device data.        Post Anesthesia Care and Evaluation    Patient location during evaluation: PACU  Patient participation: complete - patient participated  Level of consciousness: awake and alert and awake  Pain score: 3  Pain management: adequate    Airway patency: patent  Anesthetic complications: No anesthetic complications  PONV Status: controlled  Cardiovascular status: acceptable, hemodynamically stable and stable  Respiratory status: acceptable and nasal cannula  Hydration status: acceptable    Comments: Vital signs as noted in nursing documentation as per protocol.

## 2023-04-07 NOTE — PROGRESS NOTES
LOS: 1 day   Patient Care Team:  Lila Boswell APRN as PCP - General (Nurse Practitioner)         HPI: Patient without significant complaints.  Pain is under control.  No flatus.  No nausea or vomiting.    ROS:    Vital Signs  Temp:  [97.3 °F (36.3 °C)-101.2 °F (38.4 °C)] 98.9 °F (37.2 °C)  Heart Rate:  [] 109  Resp:  [16-26] 16  BP: (118-141)/(65-84) 129/67    Physical Exam:     General Appearance:  Alert and cooperative, not in any acute distress.   Cardiovascular/Heart:  Normal S1 and S2,    GI/Abdomen:    Soft with perhaps mild distention.  Positive bowel sounds.  Wounds clean dry and intact              Results Review:       Lab Results (last 24 hours)     Procedure Component Value Units Date/Time    Basic Metabolic Panel [[id number]]  (Abnormal) Collected: 04/07/23 0614    Specimen: Blood Updated: 04/07/23 0644     Glucose 143 mg/dL      BUN 13 mg/dL      Creatinine 1.14 mg/dL      Sodium 135 mmol/L      Potassium 4.3 mmol/L      Chloride 99 mmol/L      CO2 24.6 mmol/L      Calcium 8.5 mg/dL      BUN/Creatinine Ratio 11.4     Anion Gap 11.4 mmol/L      eGFR 89.3 mL/min/1.73     Narrative:      GFR Normal >60  Chronic Kidney Disease <60  Kidney Failure <15      CBC & Differential [[id number]]  (Abnormal) Collected: 04/07/23 0614    Specimen: Blood Updated: 04/07/23 0626    Narrative:      The following orders were created for panel order CBC & Differential.  Procedure                               Abnormality         Status                     ---------                               -----------         ------                     CBC Auto Differential[[id number]]        Abnormal            Final result                 Please view results for these tests on the individual orders.    CBC Auto Differential [[id number]]  (Abnormal) Collected: 04/07/23 0614    Specimen: Blood Updated: 04/07/23 0626     WBC 9.79 10*3/mm3      RBC 4.35 10*6/mm3      Hemoglobin 14.2 g/dL      Hematocrit 38.8 %      MCV 89.2 fL       MCH 32.6 pg      MCHC 36.6 g/dL      RDW 11.8 %      RDW-SD 37.8 fl      MPV 9.9 fL      Platelets 184 10*3/mm3      Neutrophil % 81.7 %      Lymphocyte % 6.4 %      Monocyte % 11.0 %      Eosinophil % 0.1 %      Basophil % 0.3 %      Immature Grans % 0.5 %      Neutrophils, Absolute 7.99 10*3/mm3      Lymphocytes, Absolute 0.63 10*3/mm3      Monocytes, Absolute 1.08 10*3/mm3      Eosinophils, Absolute 0.01 10*3/mm3      Basophils, Absolute 0.03 10*3/mm3      Immature Grans, Absolute 0.05 10*3/mm3      nRBC 0.0 /100 WBC     TISSUE EXAM, [facility]) [[id number]] Collected: 04/06/23 2115    Specimen: Tissue from Large Intestine, Right / Ascending Colon Updated: 04/06/23 2227              Assessment & Plan       Acute appendicitis with localized peritonitis, without perforation, abscess, or gangrene    Postoperative day 1 after right hemicolectomy for presumed acute on chronic appendicitis with significant phlegmon posterior lateral to the cecum.  Patient doing well.  Recommend ambulation.  Start him on clears.    Hasmukh Mckeon MD  04/07/23  17:47 EDT

## 2023-04-07 NOTE — CASE MANAGEMENT/SOCIAL WORK
Discharge Planning Assessment  [facility]     Patient Name: Prashanth Freeman  MRN: [id number]  Today's Date: 4/7/2023    Admit Date: 4/6/2023    Plan: CM met with pt and his wife at bedside. Demographics verified. Pt has no LW or POA, has not been inpatient in past 30 days. His primary is [facility] and pharmacy is [facility]. [facility] dc rx were sent last night to [facility] as they were open late and she could pick them up. Financial or dc concerns denied. Pt is independent at baseline and is planning to return home with his wife transporting.   Discharge Needs Assessment     Row Name 04/07/23 1106       Living Environment    People in Home spouse    Name(s) of People in Home Luis F Freeman wife    Duration at Residence greater than 1 yr    Potentially Unsafe Housing Conditions none    Primary Care Provided by self    Quality of Family Relationships helpful;involved    Able to Return to Prior Arrangements yes       Food Insecurity    Within the past 12 months, you worried that your food would run out before you got the money to buy more. Never true    Within the past 12 months, the food you bought just didn't last and you didn't have money to get more. Never true       Discharge Needs Assessment    Readmission Within the Last 30 Days no previous admission in last 30 days    Concerns to be Addressed denies needs/concerns at this time    Anticipated Changes Related to Illness none    Equipment Needed After Discharge none               Discharge Plan     Row Name 04/07/23 1102       Plan    Plan CM met with pt and his wife at bedside. Demographics verified. Pt has no LW or POA, has not been inpatient in past 30 days. His primary is [facility] and pharmacy is [facility]. [facility] dc rx were sent last night to [facility] as they were open late and she could pick them up. Financial or dc concerns denied. Pt is independent at baseline and is planning to return home  with his wife transporting.              Continued Care and Services - Admitted Since 4/6/2023    Coordination has not been started for this encounter.          Demographic Summary     Row Name 04/07/23 1105       General Information    Admission Type inpatient    Arrived From physician office - internal    Referral Source admission list    Reason for Consult discharge planning    Preferred Language English       Contact Information    Permission Granted to Share Info With family/designee               Functional Status     Row Name 04/07/23 1106       Functional Status, IADL    Medications independent    Meal Preparation independent    Housekeeping independent    Laundry independent    Shopping independent       Mental Status Summary    Recent Changes in Mental Status/Cognitive Functioning no changes       Employment/    Employment Status employed full-time               Psychosocial    No documentation.                Abuse/Neglect    No documentation.                Legal    No documentation.                Substance Abuse    No documentation.                Patient Forms    No documentation.                   Apolonia Figueroa RN

## 2023-04-07 NOTE — PAYOR COMM NOTE
"To:  Beny  From: Jazmin Mitchell RN  Phone: [phone]  Fax: [phone]  NPI: [id number]  TIN: [id number]  Member ID: [id number]  MRN: [id number]    Ashli Freeman (27 y.o. Male)     Date of Birth   1995    Social Security Number       Address   [address] [place] [address]    Home Phone   [phone]    MRN   [id number]       Druze   None    Marital Status                               Admission Date   23    Admission Type   Urgent    Admitting Provider   Hasmukh Mckeon MD    Attending Provider   Hasmukh Mckeon MD    Department, Room/Bed   [facility] TELEMETRY        Discharge Date       Discharge Disposition       Discharge Destination                               Attending Provider: Hasmukh Mckeon MD    Allergies: No Known Allergies    Isolation: None   Infection: None   Code Status: CPR    Ht: 180.3 cm (71\")   Wt: 131 kg (287 lb 14.7 oz)    Admission Cmt: None   Principal Problem: Acute appendicitis with localized peritonitis, without perforation, abscess, or gangrene [K35.30]                 Active Insurance as of 2023     Primary Coverage     Payor Plan Insurance Group Employer/Plan Group    [place] [id number]     Payor Plan Address Payor Plan Phone Number Payor Plan Fax Number Effective Dates    [address]   2017 - None Entered    [place] [address]       Subscriber Name Subscriber Birth Date Member ID       ASHLI FREEMAN 1995 [id number]                 Emergency Contacts      (Rel.) Home Phone Work Phone Mobile Phone    LAZARUS FREEMAN (Spouse) [phone] -- --               History & Physical      Hasmukh Mckeon MD at 23 [facility]9              [facility]   HISTORY AND PHYSICAL      Name:  Ashli Freeman   Age:  27 y.o.  Sex:  male  :  1995  MRN:  [id number]   Visit Number:  [id number]  Admission Date:  2023  Date Of Service:  23  Primary Care " Physician:  Lila Boswell APRN    Chief Complaint:     I have abdominal pain    History Of Presenting Illness:      Patient with abdominal pain for approximately 1 week.  He noted that he had this similar pain about a month ago.  It has progressed in that time.  Some nausea without vomiting.  He points to the right mid and lower quadrant    Review Of Systems:     General ROS: Patient denies any fevers, chills or loss of consciousness.  No complaints of generalized weakness  Psychological ROS: Denies any hallucinations and delusions.  Ophthalmic ROS: no transient loss of vision.  ENT ROS: Denies sore throat, nasal congestion or ear pain.   Allergy and Immunology ROS: Denies rash or itching.  Hematological and Lymphatic ROS: Denies neck swelling or easy bleeding.  Respiratory ROS: Denies cough or shortness of breath.   Cardiovascular ROS: Denies chest pain or palpitations. No history of exertional chest pain.   Gastrointestinal ROS: As per history of present illness  Genito-Urinary ROS: Denies dysuria or hematuria.  Musculoskeletal ROS: no back pain. No muscle pain. No calf pain.   Neurological ROS: Denies any focal weakness. No loss of consciousness. Denies any numbness.   Dermatological ROS: Denies any redness or pruritis.     Past Medical History:    History reviewed. No pertinent past medical history.    Past Surgical history:    History reviewed. No pertinent surgical history.    Social History:    Social History     Socioeconomic History   • Marital status:        Family History:    History reviewed. No pertinent family history.    Allergies:      Patient has no known allergies.    Home Medications:    Prior to Admission Medications     Prescriptions Last Dose Informant Patient Reported? Taking?    amphetamine-dextroamphetamine (ADDERALL) 20 MG tablet 4/3/2023 Self Yes No    Take 1 tablet by mouth Daily.    amphetamine-dextroamphetamine XR (ADDERALL XR) 10 MG 24 hr capsule 4/3/2023 Self Yes No    Take  1 capsule by mouth Every Morning    risperiDONE (risperDAL) 1 MG tablet  Self Yes No    Take 1 tablet by mouth 2 (Two) Times a Day. Pt dont remember the dose             ED Medications:    Medications   sodium chloride 0.9 % flush 10 mL (has no administration in time range)   lactated ringers infusion 1,000 mL (has no administration in time range)       Vital Signs:    Temp:  [98.4 °F (36.9 °C)] 98.4 °F (36.9 °C)  Heart Rate:  [96] 96  Resp:  [17] 17  BP: (127)/(79) 127/79        04/06/23  1556   Weight: 136 kg (300 lb)       Body mass index is 41.84 kg/m².    Physical Exam:      General Appearance:  Alert and cooperative, not in any acute distress.   Head:  Atraumatic and normocephalic, without obvious abnormality.   Eyes:          PERRLA, conjunctivae and sclerae normal, no Icterus. No pallor. Extra-occular movements are within normal limits.   Ears:  Ears appear intact with no abnormalities noted.   Throat: No oral lesions, no thrush, oral mucosa moist.   Respiratory/Lungs:   Breath sounds heard bilaterally equally.  No crackles or wheezing. No Pleural rub or bronchial breathing. Normal respiratory effort.    Cardiovascular/Heart:  Normal S1 and S2, no murmur. No edema   GI/Abdomen:   No masses, no hepatosplenomegaly. Soft,  non-distended, Tender with localized guarding in the right lower quadrant.     Musculoskeletal/ Extremities:   Moves all extremities well   Skin: No bleeding, bruising or rash, no induration   Psychiatric : Alert and oriented ×3.  No depression or anxiety    Neurologic: Cranial nerves 2 - 12 grossly intact, sensation intact, Motor power is normal and equal bilaterally.       EKG:          Labs:    Lab Results (last 24 hours)     ** No results found for the last 24 hours. **          Radiology:    Imaging Results (Last 72 Hours)     ** No results found for the last 72 hours. **          Assessment:    Acute appendicitis based on CT scan evaluation performed today.    Plan:     I recommend a  laparoscopic appendectomy to the patient.  The patient understands this procedure as well as the possibility of converting to an open procedure.  As well, the patient understands the risks which include but are not limited to bleeding, infection including abscess, bowel injury, cardiopulmonary risks etc. The patient understands    all of the above and wishes to proceed.    Hasmukh Mckeon MD  04/06/23  16:29 EDT  .    Electronically signed by Hasmukh Mckeon MD at 04/06/23 1630       Vital Signs (last day)     Date/Time Temp Temp src Pulse Resp BP Patient Position SpO2    04/07/23 1100 98.6 (37) Oral 103 16 125/65 Lying 96    04/07/23 0736 98.2 (36.8) Oral 104 16 141/77 Lying 98    04/07/23 0309 98.8 (37.1) Oral 84 16 132/76 Lying 96    04/06/23 2343 98.2 (36.8) Oral 63 20 118/69 Lying 96    04/06/23 2330 97.5 (36.4) Temporal 61 20 128/81 Lying 95    04/06/23 2315 -- -- 58 18 135/82 Lying 93    04/06/23 2300 -- -- 65 17 124/75 Lying 96    04/06/23 2245 -- -- 79 19 132/84 Lying 95    04/06/23 2240 -- -- 69 26 130/79 Lying 94    04/06/23 2235 -- -- 64 21 128/78 Lying 94    04/06/23 2230 -- -- 69 17 127/80 Lying 95    04/06/23 2225 -- -- 73 19 119/78 Lying 100    04/06/23 2220 -- -- 71 22 124/76 Lying 100    04/06/23 2215 97.3 (36.3) Temporal 72 20 132/75 Lying 100    04/06/23 1650 99.4 (37.4) -- -- -- -- -- --    04/06/23 1556 98.4 (36.9) Temporal 96 17 127/79 Sitting 98            Current Facility-Administered Medications   Medication Dose Route Frequency Provider Last Rate Last Admin   • acetaminophen (TYLENOL) tablet 650 mg  650 mg Oral Q4H PRN Hasmukh Mckeon MD       • amphetamine-dextroamphetamine (ADDERALL) tablet 20 mg  20 mg Oral Daily Hasmukh Mckeon MD       • amphetamine-dextroamphetamine XR (ADDERALL XR) 24 hr capsule 10 mg  10 mg Oral Daily Hasmukh Mckeon MD       • dextrose 5 % and lactated Ringer's infusion  125 mL/hr Intravenous Continuous Hasmukh Mckeon  mL/hr at 04/07/23  0620 125 mL/hr at 04/07/23 0620   • Enoxaparin Sodium (LOVENOX) syringe 40 mg  40 mg Subcutaneous Daily Hasmukh Mckeon MD   40 mg at 04/07/23 0640   • HYDROcodone-acetaminophen (NORCO)  MG per tablet 1 tablet  1 tablet Oral Q4H PRN Hasmukh Mckeon MD   1 tablet at 04/07/23 0910   • HYDROcodone-acetaminophen (NORCO) 5-325 MG per tablet 1 tablet  1 tablet Oral Q4H PRN Hasmukh Mckeon MD       • lactated ringers infusion 1,000 mL  1,000 mL Intravenous Continuous Hasmukh Mckeon MD 25 mL/hr at 04/06/23 1945 New Bag at 04/06/23 2144   • Morphine sulfate (PF) injection 2 mg  2 mg Intravenous Q1H PRN Hasmukh Mckeon MD   2 mg at 04/07/23 0340    And   • naloxone (NARCAN) injection 0.4 mg  0.4 mg Intravenous Q5 Min PRN Hasmukh Mckeon MD       • Morphine sulfate (PF) injection 4 mg  4 mg Intravenous Q1H PRN Hasmukh Mckeon MD   4 mg at 04/07/23 0615    And   • naloxone (NARCAN) injection 0.4 mg  0.4 mg Intravenous Q5 Min PRN Hasmukh Mckeon MD       • ondansetron (ZOFRAN) injection 4 mg  4 mg Intravenous Q8H PRN Hasmukh Mckeon MD   4 mg at 04/07/23 0914   • pantoprazole (PROTONIX) injection 40 mg  40 mg Intravenous Q AM Hasmukh Mckeon MD   40 mg at 04/07/23 0615   • risperiDONE (risperDAL) tablet 1 mg  1 mg Oral BID Hasmukh Mckeon MD   1 mg at 04/07/23 0910       Lab Results (last 24 hours)     Procedure Component Value Units Date/Time    Basic Metabolic Panel [[id number]]  (Abnormal) Collected: 04/07/23 0614    Specimen: Blood Updated: 04/07/23 0644     Glucose 143 mg/dL      BUN 13 mg/dL      Creatinine 1.14 mg/dL      Sodium 135 mmol/L      Potassium 4.3 mmol/L      Chloride 99 mmol/L      CO2 24.6 mmol/L      Calcium 8.5 mg/dL      BUN/Creatinine Ratio 11.4     Anion Gap 11.4 mmol/L      eGFR 89.3 mL/min/1.73     Narrative:      GFR Normal >60  Chronic Kidney Disease <60  Kidney Failure <15      CBC & Differential [[id number]]  (Abnormal) Collected: 04/07/23 0614    Specimen:  Blood Updated: 04/07/23 0626    Narrative:      The following orders were created for panel order CBC & Differential.  Procedure                               Abnormality         Status                     ---------                               -----------         ------                     CBC Auto Differential[[id number]]        Abnormal            Final result                 Please view results for these tests on the individual orders.    CBC Auto Differential [[id number]]  (Abnormal) Collected: 04/07/23 0614    Specimen: Blood Updated: 04/07/23 0626     WBC 9.79 10*3/mm3      RBC 4.35 10*6/mm3      Hemoglobin 14.2 g/dL      Hematocrit 38.8 %      MCV 89.2 fL      MCH 32.6 pg      MCHC 36.6 g/dL      RDW 11.8 %      RDW-SD 37.8 fl      MPV 9.9 fL      Platelets 184 10*3/mm3      Neutrophil % 81.7 %      Lymphocyte % 6.4 %      Monocyte % 11.0 %      Eosinophil % 0.1 %      Basophil % 0.3 %      Immature Grans % 0.5 %      Neutrophils, Absolute 7.99 10*3/mm3      Lymphocytes, Absolute 0.63 10*3/mm3      Monocytes, Absolute 1.08 10*3/mm3      Eosinophils, Absolute 0.01 10*3/mm3      Basophils, Absolute 0.03 10*3/mm3      Immature Grans, Absolute 0.05 10*3/mm3      nRBC 0.0 /100 WBC     TISSUE EXAM, [facility] ([facility]) [[id number]] Collected: 04/06/23 2115    Specimen: Tissue from Large Intestine, Right / Ascending Colon Updated: 04/06/23 2227    Basic Metabolic Panel [[id number]]  (Abnormal) Collected: 04/06/23 1654    Specimen: Blood from Arm, Left Updated: 04/06/23 1722     Glucose 91 mg/dL      BUN 10 mg/dL      Creatinine 1.00 mg/dL      Sodium 134 mmol/L      Potassium 4.0 mmol/L      Chloride 98 mmol/L      CO2 26.5 mmol/L      Calcium 8.9 mg/dL      BUN/Creatinine Ratio 10.0     Anion Gap 9.5 mmol/L      eGFR 104.5 mL/min/1.73     Narrative:      GFR Normal >60  Chronic Kidney Disease <60  Kidney Failure <15      CBC (No Diff) [[id number]]  (Abnormal) Collected: 04/06/23 1654    Specimen: Blood from Arm,  Left Updated: 04/06/23 1702     WBC 10.44 10*3/mm3      RBC 4.35 10*6/mm3      Hemoglobin 13.8 g/dL      Hematocrit 38.2 %      MCV 87.8 fL      MCH 31.7 pg      MCHC 36.1 g/dL      RDW 11.6 %      RDW-SD 37.4 fl      MPV 9.6 fL      Platelets 179 10*3/mm3         Imaging Results (Last 24 Hours)     ** No results found for the last 24 hours. **        Orders (last 24 hrs)      Start     Ordered    04/07/23 0900  amphetamine-dextroamphetamine (ADDERALL) tablet 20 mg  Daily        Note to Pharmacy: Please please check dosage with patient once medication is supplied    04/06/23 2226 04/07/23 0900  amphetamine-dextroamphetamine XR (ADDERALL XR) 24 hr capsule 10 mg  Daily        Note to Pharmacy: Please please check dosage with patient once medication is supplied    04/06/23 2226 04/07/23 0700  Enoxaparin Sodium (LOVENOX) syringe 40 mg  Daily         04/06/23 2226 04/07/23 0600  Basic Metabolic Panel  Morning Draw         04/06/23 2226 04/07/23 0600  CBC & Differential  Morning Draw         04/06/23 2226 04/07/23 0600  pantoprazole (PROTONIX) injection 40 mg  Every Early Morning         04/06/23 2226 04/07/23 0600  CBC Auto Differential  PROCEDURE ONCE         04/06/23 2226 04/06/23 2315  risperiDONE (risperDAL) tablet 1 mg  2 Times Daily         04/06/23 2226 04/06/23 2315  dextrose 5 % and lactated Ringer's infusion  Continuous         04/06/23 2226 04/06/23 2225  ondansetron (ZOFRAN) injection 4 mg  Every 8 Hours PRN         04/06/23 2226 04/06/23 2225  Vital signs every 5 minutes for 15 minutes, every 15 minutes thereafter.  Once,   Status:  Canceled         04/06/23 2224 04/06/23 2225  Notify Anesthesia of Any Acute Changes in Patient Condition  Until Discontinued,   Status:  Canceled         04/06/23 2224 04/06/23 2225  Notify Anesthesia for Unrelieved Pain  Until Discontinued,   Status:  Canceled         04/06/23 2224 04/06/23 2225  Once DC criteria to floor met, follow  surgeon's orders.  Continuous,   Status:  Canceled         04/06/23 2224 04/06/23 2225  Discharge patient from PACU when discharge criteria is met.  Continuous,   Status:  Canceled         04/06/23 2224 04/06/23 2224  Morphine sulfate (PF) injection 4 mg  Every 1 Hour PRN        See Hyperspace for full Linked Orders Report.    04/06/23 2226 04/06/23 2224  naloxone (NARCAN) injection 0.4 mg  Every 5 Minutes PRN        See Hyperspace for full Linked Orders Report.    04/06/23 2226 04/06/23 2224  HYDROcodone-acetaminophen (NORCO)  MG per tablet 1 tablet  Every 4 Hours PRN         04/06/23 2226 04/06/23 2224  Morphine sulfate (PF) injection 2 mg  Every 1 Hour PRN        See Hyperspace for full Linked Orders Report.    04/06/23 2226 04/06/23 2224  HYDROmorphone (DILAUDID) injection 0.5 mg  Every 5 Minutes PRN,   Status:  Discontinued         04/06/23 2224 04/06/23 2224  LORazepam (ATIVAN) injection 1 mg  Every 15 Minutes PRN,   Status:  Discontinued         04/06/23 2224 04/06/23 2224  prochlorperazine (COMPAZINE) injection 10 mg  Once As Needed         04/06/23 2224 04/06/23 2224  meperidine (DEMEROL) injection 12.5 mg  Every 5 Minutes PRN,   Status:  Discontinued         04/06/23 2224 04/06/23 2224  naloxone (NARCAN) injection 0.4 mg  Every 5 Minutes PRN        See Hyperspace for full Linked Orders Report.    04/06/23 2226 04/06/23 2224  HYDROcodone-acetaminophen (NORCO) 5-325 MG per tablet 1 tablet  Every 4 Hours PRN         04/06/23 2226 04/06/23 2224  Vital Signs  Per Hospital Policy         04/06/23 2226 04/06/23 2224  NPO Diet NPO Type: Strict NPO  Diet Effective Now         04/06/23 2226 04/06/23 2223  acetaminophen (TYLENOL) tablet 650 mg  Every 4 Hours PRN         04/06/23 2226 04/06/23 2223  Inpatient Admission  Once         04/06/23 2226 04/06/23 2223  Code Status and Medical Interventions:  Continuous         04/06/23 2226 04/06/23 2222  Notify  physician (specify)  Until Discontinued         04/06/23 2226 04/06/23 2116  TISSUE EXAM, [facility] ([facility])  RELEASE UPON ORDERING         04/06/23 2116 04/06/23 2002  TISSUE EXAM, [facility])  RELEASE UPON ORDERING,   Status:  Canceled         04/06/23 2002 04/06/23 2002  sterile water irrigation solution  As Needed,   Status:  Discontinued         04/06/23 2002 04/06/23 2001  bupivacaine-EPINEPHrine PF (MARCAINE w/EPI) 0.25% -1:996023 injection  As Needed,   Status:  Discontinued         04/06/23 2001 04/06/23 2001  sodium chloride (NS) irrigation solution  As Needed,   Status:  Discontinued         04/06/23 2001 04/06/23 1730  ampicillin-sulbactam 3 g/100 mL 0.9% NS IVPB  Once         04/06/23 1633    04/06/23 1632  Obtain Informed Consent  Once,   Status:  Canceled         04/06/23 1632    04/06/23 1630  lactated ringers infusion 1,000 mL  Continuous         04/06/23 1544    04/06/23 1625  CBC (No Diff)  Once         04/06/23 1624    04/06/23 1625  Basic Metabolic Panel  STAT         04/06/23 1624    04/06/23 1544  Follow Anesthesia Guidelines / Protocol  Once,   Status:  Canceled         04/06/23 1544    04/06/23 1544  Insert Peripheral IV  Once,   Status:  Canceled         04/06/23 1544    04/06/23 1544  Maintain IV Access  Continuous,   Status:  Canceled         04/06/23 1544    04/06/23 1543  sodium chloride 0.9 % flush 10 mL  As Needed,   Status:  Discontinued         04/06/23 1544    04/06/23 0000  ibuprofen (ADVIL,MOTRIN) 800 MG tablet  Every 6 Hours PRN         04/06/23 1812    04/06/23 0000  ondansetron (Zofran) 4 MG tablet  Every 8 Hours PRN         04/06/23 1812    04/06/23 0000  HYDROcodone-acetaminophen (NORCO) 5-325 MG per tablet  Every 4 Hours PRN         04/06/23 1812    Unscheduled  Oxygen Therapy- Nasal Cannula; 2 LPM; Titrate for SPO2: equal to or greater than, per policy, 90%  Continuous PRN       04/06/23 5438    Unscheduled  Pulse Oximetry, Continuous   Continuous PRN       04/06/23 2358    --  amphetamine-dextroamphetamine XR (ADDERALL XR) 10 MG 24 hr capsule  Every Morning         04/06/23 1605    --  amphetamine-dextroamphetamine (ADDERALL) 20 MG tablet  Daily         04/06/23 1605    --  risperiDONE (risperDAL) 0.5 MG tablet  2 Times Daily         04/06/23 1605    --  venlafaxine (EFFEXOR) 37.5 MG tablet  Daily         04/07/23 1037    --  Cholecalciferol (Vitamin D) 50 MCG (2000 UT) capsule  Daily         04/07/23 1039    --  cyanocobalamin (VITAMIN B-12) 1000 MCG tablet  Daily         04/07/23 1039                   Operative/Procedure Notes (last 24 hours)      Hasmukh Mckeon MD at 04/06/23 1951        PATIENT:    Prashanth Freeman    DATE OF SURGERY:    4/6/2023    PHYSICIAN:    Hasmukh Mckeon MD    REFERRING PHYSICIAN:  Hasmukh Mckeon MD    YOB: 1995    PREOPERATIVE DIAGNOSIS:  Acute appendicitis.    POSTOPERATIVE DIAGNOSIS: Chronic appendicitis/cecal mass.    PROCEDURE: Diagnostic laparoscopy with conversion to hand-assisted laparoscopic right hemicolectomy    Specimen: Right colon    EBL: Less than 100 mL    Complications: None    OPERATIVE PROCEDURE:  The patient was taken to the operating room in the normal manner and given general endotracheal anesthesia.  The patient was also given preoperative IV antibiotics.  I did discuss the situation with the patient preoperatively and I marked them accordingly.  An appropriate timeout was performed intraoperatively prior to the incision.      A infraumbilical incision was made to insert a 5 mm Optiview into the peritoneum to insufflate the abdomen with CO2.  This port was exchanged for a 12 mm port.  A lower midline and upper midline 5 mm ports were then placed.    General inspection revealed no significant initial abnormality.  Laparoscopically initially attempted to mobilize the cecum and felt a hard mass posterior to the cecum.  I was unable to mobilize this area to any significant  degree after multiple attempts.  No plane was able to be developed.  I therefore converted to a hand assist approach.  Midline incision was made above the umbilicus.  GelPort was inserted.  An additional right upper quadrant 5 mm port was then also placed.    Again, hard mass posterior lateral to the cecum was encountered.  This is presumably from acute on chronic appendicitis versus neoplasm.  I elected to proceed with a right hemicolectomy.  Using blunt dissection I was gently able to dissect free the cecum and the white line of Toldt was also divided using the harmonic scalpel.  Mobilizing the cecum was somewhat challenging but ultimately was able to be freed.  The hepatic flexure and proximal transverse colon were then further dissected free with the harmonic scalpel until it was well mobilized.  The cecum and right colon were then able to be delivered through the port site.  Even after the cecum was delivered through the port site there was no clear identifiable appendix and was consistent with a chronic phlegmon versus neoplasm.  ELOY was then used to divide the terminal ileum.  The colon was similarly divided in the proximal transverse colon.  The intervening mesentery was divided between clamps until ultimately the colon was resected and removed.  Mesentery was tied with silk ties.  Excellent mesenteric pulse was noted at both the divided portion of the terminal ileum and at the divided portion of the transverse colon.  A functional end-to-end anastomosis was then created.  Enterotomies were performed and ELOY stapling device was used for initial anastomosis.  Final enterotomy was then closed with a TA stapling device.  Leak test was performed and no evidence of leak was appreciated.  Incidentally there was mild ooze from the staple line which was controlled with simple interrupted silk suture.  Crotch of the anastomosis was reinforced with a simple silk suture as well.  Bile was returned to the peritoneal  cavity.  GelPort reinserted and laparoscopically the peritoneal cavity reinspected.  There was no evidence of any ongoing bleeding and hemostasis had been well controlled.  At this time GelPort was removed.  Counts were performed and were correct x2 and lap sponges were counted 3 times and were correct as well.  Patient was reprepped and draped and the fascia was reapproximated using interrupted figure-of-eight PDS closure.  PDS and staples were used for the skin closure.  Counts were again performed including sponges x2 and were correct.  Dressings were applied.  There were no intraoperative complications.  Patient tolerated seizure well was transferred in stable addition to recovery.    Hasmukh Mckeon MD    4/6/2023    22:26 EDT      Electronically signed by Hasmukh Mckeon MD at 04/06/23 8663       Physician Progress Notes (last 24 hours)  Notes from 04/06/23 1121 through 04/07/23 1121   No notes of this type exist for this encounter.

## 2023-04-07 NOTE — PLAN OF CARE
Goal Outcome Evaluation:  Plan of Care Reviewed With: patient        Progress: improving  Outcome Evaluation: VSS.  Pt c/o pain that was controlled with PRN meds.  Pt had slight fever that was controlled with PRN meds and incentive spirometer.   Pt did sit up in the chiar for a couple of hours during shift.  No other changes in pt condition to report.  Will monitor.

## 2023-04-07 NOTE — OP NOTE
PATIENT:    Prashanth Freeman    DATE OF SURGERY:    4/6/2023    PHYSICIAN:    Hasmukh Mckeon MD    REFERRING PHYSICIAN:  Hasmukh Mckeon MD    YOB: 1995    PREOPERATIVE DIAGNOSIS:  Acute appendicitis.    POSTOPERATIVE DIAGNOSIS: Chronic appendicitis/cecal mass.    PROCEDURE: Diagnostic laparoscopy with conversion to hand-assisted laparoscopic right hemicolectomy    Specimen: Right colon    EBL: Less than 100 mL    Complications: None    OPERATIVE PROCEDURE:  The patient was taken to the operating room in the normal manner and given general endotracheal anesthesia.  The patient was also given preoperative IV antibiotics.  I did discuss the situation with the patient preoperatively and I marked them accordingly.  An appropriate timeout was performed intraoperatively prior to the incision.      A infraumbilical incision was made to insert a 5 mm Optiview into the peritoneum to insufflate the abdomen with CO2.  This port was exchanged for a 12 mm port.  A lower midline and upper midline 5 mm ports were then placed.    General inspection revealed no significant initial abnormality.  Laparoscopically initially attempted to mobilize the cecum and felt a hard mass posterior to the cecum.  I was unable to mobilize this area to any significant degree after multiple attempts.  No plane was able to be developed.  I therefore converted to a hand assist approach.  Midline incision was made above the umbilicus.  GelPort was inserted.  An additional right upper quadrant 5 mm port was then also placed.    Again, hard mass posterior lateral to the cecum was encountered.  This is presumably from acute on chronic appendicitis versus neoplasm.  I elected to proceed with a right hemicolectomy.  Using blunt dissection I was gently able to dissect free the cecum and the white line of Toldt was also divided using the harmonic scalpel.  Mobilizing the cecum was somewhat challenging but ultimately was able to be freed.  The  hepatic flexure and proximal transverse colon were then further dissected free with the harmonic scalpel until it was well mobilized.  The cecum and right colon were then able to be delivered through the port site.  Even after the cecum was delivered through the port site there was no clear identifiable appendix and was consistent with a chronic phlegmon versus neoplasm.  ELOY was then used to divide the terminal ileum.  The colon was similarly divided in the proximal transverse colon.  The intervening mesentery was divided between clamps until ultimately the colon was resected and removed.  Mesentery was tied with silk ties.  Excellent mesenteric pulse was noted at both the divided portion of the terminal ileum and at the divided portion of the transverse colon.  A functional end-to-end anastomosis was then created.  Enterotomies were performed and ELOY stapling device was used for initial anastomosis.  Final enterotomy was then closed with a TA stapling device.  Leak test was performed and no evidence of leak was appreciated.  Incidentally there was mild ooze from the staple line which was controlled with simple interrupted silk suture.  Crotch of the anastomosis was reinforced with a simple silk suture as well.  Bowel was returned to the peritoneal cavity.  GelPort reinserted and laparoscopically the peritoneal cavity reinspected.  There was no evidence of any ongoing bleeding and hemostasis had been well controlled.  At this time GelPort was removed.  Counts were performed and were correct x2 and lap sponges were counted 3 times and were correct as well.  Patient was reprepped and draped and the fascia was reapproximated using interrupted figure-of-eight PDS closure.  PDS and staples were used for the skin closure.  Counts were again performed including sponges x2 and were correct.  Dressings were applied.  There were no intraoperative complications.  Patient tolerated seizure well was transferred in stable  addition to recovery.    Hamsukh Mckeon MD    4/6/2023    22:26 EDT

## 2023-04-08 LAB
ANION GAP SERPL CALCULATED.3IONS-SCNC: 9 MMOL/L (ref 5–15)
BUN SERPL-MCNC: 7 MG/DL (ref 6–20)
BUN/CREAT SERPL: 8.1 (ref 7–25)
CALCIUM SPEC-SCNC: 8.3 MG/DL (ref 8.6–10.5)
CHLORIDE SERPL-SCNC: 99 MMOL/L (ref 98–107)
CO2 SERPL-SCNC: 26 MMOL/L (ref 22–29)
CREAT SERPL-MCNC: 0.86 MG/DL (ref 0.76–1.27)
DEPRECATED RDW RBC AUTO: 37.7 FL (ref 37–54)
EGFRCR SERPLBLD CKD-EPI 2021: 120.2 ML/MIN/1.73
ERYTHROCYTE [DISTWIDTH] IN BLOOD BY AUTOMATED COUNT: 11.6 % (ref 12.3–15.4)
GLUCOSE SERPL-MCNC: 124 MG/DL (ref 65–99)
HCT VFR BLD AUTO: 31.6 % (ref 37.5–51)
HGB BLD-MCNC: 11.4 G/DL (ref 13–17.7)
MCH RBC QN AUTO: 31.8 PG (ref 26.6–33)
MCHC RBC AUTO-ENTMCNC: 36.1 G/DL (ref 31.5–35.7)
MCV RBC AUTO: 88 FL (ref 79–97)
PLATELET # BLD AUTO: 172 10*3/MM3 (ref 140–450)
PMV BLD AUTO: 9.7 FL (ref 6–12)
POTASSIUM SERPL-SCNC: 3.9 MMOL/L (ref 3.5–5.2)
RBC # BLD AUTO: 3.59 10*6/MM3 (ref 4.14–5.8)
SODIUM SERPL-SCNC: 134 MMOL/L (ref 136–145)
WBC NRBC COR # BLD: 8.57 10*3/MM3 (ref 3.4–10.8)

## 2023-04-08 PROCEDURE — 25010000002 ENOXAPARIN PER 10 MG: Performed by: SURGERY

## 2023-04-08 PROCEDURE — 99024 POSTOP FOLLOW-UP VISIT: CPT | Performed by: SURGERY

## 2023-04-08 PROCEDURE — 80048 BASIC METABOLIC PNL TOTAL CA: CPT | Performed by: SURGERY

## 2023-04-08 PROCEDURE — 25010000002 MORPHINE PER 10 MG: Performed by: SURGERY

## 2023-04-08 PROCEDURE — 85027 COMPLETE CBC AUTOMATED: CPT | Performed by: SURGERY

## 2023-04-08 PROCEDURE — 25010000002 ONDANSETRON PER 1 MG: Performed by: SURGERY

## 2023-04-08 RX ADMIN — RISPERIDONE 1 MG: 1 TABLET ORAL at 20:30

## 2023-04-08 RX ADMIN — MORPHINE SULFATE 4 MG: 4 INJECTION, SOLUTION INTRAMUSCULAR; INTRAVENOUS at 21:30

## 2023-04-08 RX ADMIN — ONDANSETRON 4 MG: 2 SOLUTION INTRAMUSCULAR; INTRAVENOUS at 09:14

## 2023-04-08 RX ADMIN — HYDROCODONE BITARTRATE AND ACETAMINOPHEN 1 TABLET: 10; 325 TABLET ORAL at 16:38

## 2023-04-08 RX ADMIN — MORPHINE SULFATE 4 MG: 4 INJECTION, SOLUTION INTRAMUSCULAR; INTRAVENOUS at 09:14

## 2023-04-08 RX ADMIN — SODIUM CHLORIDE, SODIUM LACTATE, POTASSIUM CHLORIDE, CALCIUM CHLORIDE AND DEXTROSE MONOHYDRATE 125 ML/HR: 5; 600; 310; 30; 20 INJECTION, SOLUTION INTRAVENOUS at 06:10

## 2023-04-08 RX ADMIN — SODIUM CHLORIDE, SODIUM LACTATE, POTASSIUM CHLORIDE, CALCIUM CHLORIDE AND DEXTROSE MONOHYDRATE 125 ML/HR: 5; 600; 310; 30; 20 INJECTION, SOLUTION INTRAVENOUS at 21:30

## 2023-04-08 RX ADMIN — ENOXAPARIN SODIUM 40 MG: 100 INJECTION SUBCUTANEOUS at 08:53

## 2023-04-08 RX ADMIN — PANTOPRAZOLE SODIUM 40 MG: 40 INJECTION, POWDER, FOR SOLUTION INTRAVENOUS at 06:10

## 2023-04-08 RX ADMIN — RISPERIDONE 1 MG: 1 TABLET ORAL at 08:53

## 2023-04-08 RX ADMIN — SODIUM CHLORIDE, SODIUM LACTATE, POTASSIUM CHLORIDE, CALCIUM CHLORIDE AND DEXTROSE MONOHYDRATE 125 ML/HR: 5; 600; 310; 30; 20 INJECTION, SOLUTION INTRAVENOUS at 14:35

## 2023-04-08 RX ADMIN — MORPHINE SULFATE 4 MG: 4 INJECTION, SOLUTION INTRAMUSCULAR; INTRAVENOUS at 04:20

## 2023-04-08 RX ADMIN — HYDROCODONE BITARTRATE AND ACETAMINOPHEN 1 TABLET: 10; 325 TABLET ORAL at 11:34

## 2023-04-08 RX ADMIN — MORPHINE SULFATE 4 MG: 4 INJECTION, SOLUTION INTRAMUSCULAR; INTRAVENOUS at 15:46

## 2023-04-08 NOTE — PLAN OF CARE
Goal Outcome Evaluation:  Plan of Care Reviewed With: patient        Progress: improving  Outcome Evaluation: VSS.  Pt c/o pain that was controlled with PRN meds.  Pt diet advanced to full liquid and he tolerated it well.  Pt ambulated in his room and in the hallway during shift.  No other changes in pt condition to report.  WIll monitor.

## 2023-04-08 NOTE — PROGRESS NOTES
LOS: 2 days   Patient Care Team:  Lila Boswell APRN as PCP - General (Nurse Practitioner)    Chief Complaint:  POD#2    Subjective     Interval History:     No acute events overnight.  No nausea or vomiting.  Passing some flatus. No BM yet.  Tolerating clears. Pain well controlled.     Objective     Vital Signs  Temp:  [98.4 °F (36.9 °C)-101.2 °F (38.4 °C)] 100.4 °F (38 °C)  Heart Rate:  [] 100  Resp:  [16-20] 20  BP: (121-133)/(67-73) 133/71    PO 120mL  IV 2979.2mL  UOP 650mL + 1 void    Physical Exam:  General Appearance alert, appears stated age and cooperative  Head normocephalic, without obvious abnormality and atraumatic  Eyes lids and lashes normal, conjunctivae and sclerae normal and no icterus  Lungs clear to auscultation, respirations regular, respirations even and respirations unlabored  Heart regular rhythm & normal rate  Abdomen soft non-tender, no guarding, no rebound tenderness and incisions clean and dry with staples in place.  New dressings placed over the umbilical incision.   Extremities moves extremities well, no edema, no cyanosis and no redness  Skin no bleeding, bruising or rash  Neurologic Mental Status orientated to person, place, time and situation     Results Review:    I reviewed the patient's new clinical results.  Results from last 7 days   Lab Units 04/08/23 0513 04/07/23  0614 04/06/23  1654   SODIUM mmol/L 134* 135* 134*   POTASSIUM mmol/L 3.9 4.3 4.0   CHLORIDE mmol/L 99 99 98   CO2 mmol/L 26.0 24.6 26.5   BUN mg/dL 7 13 10   CREATININE mg/dL 0.86 1.14 1.00   CALCIUM mg/dL 8.3* 8.5* 8.9   GLUCOSE mg/dL 124* 143* 91       Results from last 7 days   Lab Units 04/08/23 0513 04/07/23  0614 04/06/23  1654   WBC 10*3/mm3 8.57 9.79 10.44   HEMOGLOBIN g/dL 11.4* 14.2 13.8   HEMATOCRIT % 31.6* 38.8 38.2   PLATELETS 10*3/mm3 172 184 179         Medication Review:   Scheduled Meds:amphetamine-dextroamphetamine, 20 mg, Oral, Daily  amphetamine-dextroamphetamine XR, 10 mg,  Oral, Daily  enoxaparin, 40 mg, Subcutaneous, Daily  pantoprazole, 40 mg, Intravenous, Q AM  risperiDONE, 1 mg, Oral, BID      Continuous Infusions:dextrose 5 % and lactated Ringer's, 125 mL/hr, Last Rate: 125 mL/hr (04/08/23 0610)      PRN Meds:.•  acetaminophen  •  HYDROcodone-acetaminophen  •  HYDROcodone-acetaminophen  •  Morphine **AND** naloxone  •  Morphine **AND** naloxone  •  ondansetron      Assessment & Plan       Acute appendicitis with localized peritonitis, without perforation, abscess, or gangrene    POD#2 hand assisted laparoscopic right hemicolectomy for management of acute on chronic appendicitis with a significant inflammatory phlegmon is mass adherent to the posterior lateral aspect of the cecum.  He continues to improve.  Tolerating clears, with some return of bowel function noted.   I have advanced his diet to full liquids, and we discussed caution with dietary advancement.  We discussed the importance of frequent ambulation, and being out of bed is much as possible.  Questions from the patient and his family answered to their satisfaction.  Repeat labs planned for tomorrow.  I am hopeful that he will be appropriate for discharge in the next 24 to 48 hours.      Jessica Márquez MD  04/08/23  11:32 EDT

## 2023-04-08 NOTE — PLAN OF CARE
Goal Outcome Evaluation:  Plan of Care Reviewed With: patient        Progress: improving  Outcome Evaluation: pleasant patient with supportive wife at bedside-IV fluids infusing-medications given per Dr. Mckeon's orders-PRN meds given for patient's comfort-monitor labs and continue to monitor patient-tolerating clear liquid diet at this time-hypo bowel sounds noted-encouraged patient to ambulate often in room and hallway

## 2023-04-09 LAB
ANION GAP SERPL CALCULATED.3IONS-SCNC: 7.5 MMOL/L (ref 5–15)
BASOPHILS # BLD AUTO: 0.05 10*3/MM3 (ref 0–0.2)
BASOPHILS NFR BLD AUTO: 0.6 % (ref 0–1.5)
BUN SERPL-MCNC: 6 MG/DL (ref 6–20)
BUN/CREAT SERPL: 6.7 (ref 7–25)
CALCIUM SPEC-SCNC: 8.4 MG/DL (ref 8.6–10.5)
CHLORIDE SERPL-SCNC: 100 MMOL/L (ref 98–107)
CO2 SERPL-SCNC: 26.5 MMOL/L (ref 22–29)
CREAT SERPL-MCNC: 0.9 MG/DL (ref 0.76–1.27)
DEPRECATED RDW RBC AUTO: 38.4 FL (ref 37–54)
EGFRCR SERPLBLD CKD-EPI 2021: 118.6 ML/MIN/1.73
EOSINOPHIL # BLD AUTO: 0.31 10*3/MM3 (ref 0–0.4)
EOSINOPHIL NFR BLD AUTO: 3.8 % (ref 0.3–6.2)
ERYTHROCYTE [DISTWIDTH] IN BLOOD BY AUTOMATED COUNT: 11.6 % (ref 12.3–15.4)
GLUCOSE SERPL-MCNC: 118 MG/DL (ref 65–99)
HCT VFR BLD AUTO: 33.4 % (ref 37.5–51)
HGB BLD-MCNC: 11.9 G/DL (ref 13–17.7)
IMM GRANULOCYTES # BLD AUTO: 0.05 10*3/MM3 (ref 0–0.05)
IMM GRANULOCYTES NFR BLD AUTO: 0.6 % (ref 0–0.5)
LYMPHOCYTES # BLD AUTO: 1.56 10*3/MM3 (ref 0.7–3.1)
LYMPHOCYTES NFR BLD AUTO: 18.9 % (ref 19.6–45.3)
MCH RBC QN AUTO: 32.2 PG (ref 26.6–33)
MCHC RBC AUTO-ENTMCNC: 35.6 G/DL (ref 31.5–35.7)
MCV RBC AUTO: 90.3 FL (ref 79–97)
MONOCYTES # BLD AUTO: 0.77 10*3/MM3 (ref 0.1–0.9)
MONOCYTES NFR BLD AUTO: 9.3 % (ref 5–12)
NEUTROPHILS NFR BLD AUTO: 5.51 10*3/MM3 (ref 1.7–7)
NEUTROPHILS NFR BLD AUTO: 66.8 % (ref 42.7–76)
NRBC BLD AUTO-RTO: 0 /100 WBC (ref 0–0.2)
PLATELET # BLD AUTO: 185 10*3/MM3 (ref 140–450)
PMV BLD AUTO: 9.6 FL (ref 6–12)
POTASSIUM SERPL-SCNC: 4.3 MMOL/L (ref 3.5–5.2)
RBC # BLD AUTO: 3.7 10*6/MM3 (ref 4.14–5.8)
RBC MORPH BLD: NORMAL
SMALL PLATELETS BLD QL SMEAR: ADEQUATE
SODIUM SERPL-SCNC: 134 MMOL/L (ref 136–145)
WBC MORPH BLD: NORMAL
WBC NRBC COR # BLD: 8.25 10*3/MM3 (ref 3.4–10.8)

## 2023-04-09 PROCEDURE — 85025 COMPLETE CBC W/AUTO DIFF WBC: CPT | Performed by: SURGERY

## 2023-04-09 PROCEDURE — 99024 POSTOP FOLLOW-UP VISIT: CPT | Performed by: SURGERY

## 2023-04-09 PROCEDURE — 25010000002 ENOXAPARIN PER 10 MG: Performed by: SURGERY

## 2023-04-09 PROCEDURE — 80048 BASIC METABOLIC PNL TOTAL CA: CPT | Performed by: SURGERY

## 2023-04-09 PROCEDURE — 25010000002 MORPHINE PER 10 MG: Performed by: SURGERY

## 2023-04-09 PROCEDURE — 25010000002 ONDANSETRON PER 1 MG: Performed by: SURGERY

## 2023-04-09 PROCEDURE — 85007 BL SMEAR W/DIFF WBC COUNT: CPT | Performed by: SURGERY

## 2023-04-09 RX ADMIN — SODIUM CHLORIDE, SODIUM LACTATE, POTASSIUM CHLORIDE, CALCIUM CHLORIDE AND DEXTROSE MONOHYDRATE 125 ML/HR: 5; 600; 310; 30; 20 INJECTION, SOLUTION INTRAVENOUS at 05:45

## 2023-04-09 RX ADMIN — RISPERIDONE 1 MG: 1 TABLET ORAL at 20:13

## 2023-04-09 RX ADMIN — HYDROCODONE BITARTRATE AND ACETAMINOPHEN 1 TABLET: 10; 325 TABLET ORAL at 12:17

## 2023-04-09 RX ADMIN — RISPERIDONE 1 MG: 1 TABLET ORAL at 08:02

## 2023-04-09 RX ADMIN — HYDROCODONE BITARTRATE AND ACETAMINOPHEN 1 TABLET: 10; 325 TABLET ORAL at 08:02

## 2023-04-09 RX ADMIN — SODIUM CHLORIDE, SODIUM LACTATE, POTASSIUM CHLORIDE, CALCIUM CHLORIDE AND DEXTROSE MONOHYDRATE 125 ML/HR: 5; 600; 310; 30; 20 INJECTION, SOLUTION INTRAVENOUS at 18:05

## 2023-04-09 RX ADMIN — MORPHINE SULFATE 4 MG: 4 INJECTION, SOLUTION INTRAMUSCULAR; INTRAVENOUS at 09:08

## 2023-04-09 RX ADMIN — HYDROCODONE BITARTRATE AND ACETAMINOPHEN 1 TABLET: 10; 325 TABLET ORAL at 15:49

## 2023-04-09 RX ADMIN — ONDANSETRON 4 MG: 2 SOLUTION INTRAMUSCULAR; INTRAVENOUS at 18:18

## 2023-04-09 RX ADMIN — MORPHINE SULFATE 4 MG: 4 INJECTION, SOLUTION INTRAMUSCULAR; INTRAVENOUS at 18:04

## 2023-04-09 RX ADMIN — PANTOPRAZOLE SODIUM 40 MG: 40 INJECTION, POWDER, FOR SOLUTION INTRAVENOUS at 05:45

## 2023-04-09 RX ADMIN — HYDROCODONE BITARTRATE AND ACETAMINOPHEN 1 TABLET: 10; 325 TABLET ORAL at 19:44

## 2023-04-09 RX ADMIN — ENOXAPARIN SODIUM 40 MG: 100 INJECTION SUBCUTANEOUS at 08:02

## 2023-04-09 RX ADMIN — MORPHINE SULFATE 4 MG: 4 INJECTION, SOLUTION INTRAMUSCULAR; INTRAVENOUS at 03:10

## 2023-04-09 NOTE — PLAN OF CARE
Goal Outcome Evaluation:  Plan of Care Reviewed With: patient        Progress: improving  Outcome Evaluation: pleasant patient with supportive wife at bedside-IV fluids infusing-medications given per Dr. Márquez's orders who saw him today-PRN meds given for patient's comfort-monitor labs and continue to monitor patient-diet advanced to full liquid-patient tolerating diet-passing flatus but no BM-encouraged patient to increase ambulation and use incentive spirometer

## 2023-04-09 NOTE — PROGRESS NOTES
LOS: 3 days   Patient Care Team:  Lila Boswell APRN as PCP - General (Nurse Practitioner)    Chief Complaint:  POD#3    Subjective     Interval History:     No acute events overnight.  Tolerating full liquids.  Passing flatus and had a BM this morning.      Objective     Vital Signs  Temp:  [98.1 °F (36.7 °C)-99.3 °F (37.4 °C)] 99.3 °F (37.4 °C)  Heart Rate:  [82-94] 83  Resp:  [18-20] 20  BP: (126-151)/(67-85) 133/67    PO 2240ml  IV 2947.9mL  UOP 600mL  BM x 1    Physical Exam:    General Appearance:    Alert, cooperative, in no acute distress   Head:    Normocephalic, without obvious abnormality, atraumatic   Eyes:            Lids and lashes normal, conjunctivae and sclerae normal, no   icterus   Ears:    Ears appear intact with no abnormalities noted   Lungs:     Respirations regular, even and unlabored    Heart:    Regular rhythm and normal rate   Abdomen:     Soft, NT, ND. Incisions clean and dry   Extremities:   Moves all extremities well, no edema, no cyanosis, no             redness   Skin:   No bleeding, bruising or rash   Neurologic:   AAOx3        Results Review:    I reviewed the patient's new clinical results.      Results from last 7 days   Lab Units 04/09/23  0600 04/08/23 0513 04/07/23 0614   SODIUM mmol/L 134* 134* 135*   POTASSIUM mmol/L 4.3 3.9 4.3   CHLORIDE mmol/L 100 99 99   CO2 mmol/L 26.5 26.0 24.6   BUN mg/dL 6 7 13   CREATININE mg/dL 0.90 0.86 1.14   CALCIUM mg/dL 8.4* 8.3* 8.5*   GLUCOSE mg/dL 118* 124* 143*       Results from last 7 days   Lab Units 04/09/23  0600 04/08/23  0513 04/07/23  0614   WBC 10*3/mm3 8.25 8.57 9.79   HEMOGLOBIN g/dL 11.9* 11.4* 14.2   HEMATOCRIT % 33.4* 31.6* 38.8   PLATELETS 10*3/mm3 185 172 184         Medication Review:   Scheduled Meds:amphetamine-dextroamphetamine, 20 mg, Oral, Daily  amphetamine-dextroamphetamine XR, 10 mg, Oral, Daily  enoxaparin, 40 mg, Subcutaneous, Daily  pantoprazole, 40 mg, Intravenous, Q AM  risperiDONE, 1 mg, Oral,  BID      Continuous Infusions:dextrose 5 % and lactated Ringer's, 125 mL/hr, Last Rate: 125 mL/hr (04/09/23 0545)      PRN Meds:.•  acetaminophen  •  HYDROcodone-acetaminophen  •  HYDROcodone-acetaminophen  •  Morphine **AND** naloxone  •  Morphine **AND** naloxone  •  ondansetron      Assessment & Plan       Acute appendicitis with localized peritonitis, without perforation, abscess, or gangrene      POD#3 hand assisted laparoscopic right hemicolectomy for management of acute on chronic appendicitis with a significant inflammatory phlegmon is mass adherent to the posterior lateral aspect of the cecum. He continues to improve and has had return of bowel function.  I have advanced him to a GI soft, low residue diet.  We dicussed discharge home and he does not feel ready for discharge. We discussed the importance of continued frequent ambulation.  I am hopeful that he will be appropriate for discharge home tomorrow.      Jessica Márquez MD  04/09/23  13:07 EDT

## 2023-04-09 NOTE — PLAN OF CARE
Goal Outcome Evaluation:  Plan of Care Reviewed With: patient        Progress: improving  Outcome Evaluation: VSS.  Pt c/o pain that was controlled with PRN meds.  Pt ambulated in hallway during shfit.  Pt had a bowel movement and diet was advanced to GI soft fiber restricted.  Diet was tolerated well.  No other changes in pt condition to report.  Will monitor.

## 2023-04-10 VITALS
DIASTOLIC BLOOD PRESSURE: 81 MMHG | BODY MASS INDEX: 42.87 KG/M2 | SYSTOLIC BLOOD PRESSURE: 148 MMHG | HEIGHT: 71 IN | OXYGEN SATURATION: 98 % | TEMPERATURE: 98.9 F | RESPIRATION RATE: 18 BRPM | HEART RATE: 92 BPM | WEIGHT: 306.22 LBS

## 2023-04-10 LAB — REF LAB TEST METHOD: NORMAL

## 2023-04-10 PROCEDURE — 99024 POSTOP FOLLOW-UP VISIT: CPT | Performed by: SURGERY

## 2023-04-10 PROCEDURE — 25010000002 ONDANSETRON PER 1 MG: Performed by: SURGERY

## 2023-04-10 PROCEDURE — 25010000002 MORPHINE PER 10 MG: Performed by: SURGERY

## 2023-04-10 PROCEDURE — 25010000002 ENOXAPARIN PER 10 MG: Performed by: SURGERY

## 2023-04-10 RX ADMIN — HYDROCODONE BITARTRATE AND ACETAMINOPHEN 1 TABLET: 10; 325 TABLET ORAL at 16:24

## 2023-04-10 RX ADMIN — ENOXAPARIN SODIUM 40 MG: 100 INJECTION SUBCUTANEOUS at 09:35

## 2023-04-10 RX ADMIN — RISPERIDONE 1 MG: 1 TABLET ORAL at 09:35

## 2023-04-10 RX ADMIN — HYDROCODONE BITARTRATE AND ACETAMINOPHEN 1 TABLET: 10; 325 TABLET ORAL at 09:35

## 2023-04-10 RX ADMIN — MORPHINE SULFATE 2 MG: 2 INJECTION, SOLUTION INTRAMUSCULAR; INTRAVENOUS at 05:18

## 2023-04-10 RX ADMIN — PANTOPRAZOLE SODIUM 40 MG: 40 INJECTION, POWDER, FOR SOLUTION INTRAVENOUS at 05:20

## 2023-04-10 RX ADMIN — ONDANSETRON 4 MG: 2 SOLUTION INTRAMUSCULAR; INTRAVENOUS at 05:11

## 2023-04-10 RX ADMIN — SODIUM CHLORIDE, SODIUM LACTATE, POTASSIUM CHLORIDE, CALCIUM CHLORIDE AND DEXTROSE MONOHYDRATE 125 ML/HR: 5; 600; 310; 30; 20 INJECTION, SOLUTION INTRAVENOUS at 12:06

## 2023-04-10 NOTE — PLAN OF CARE
Goal Outcome Evaluation:              Outcome Evaluation: Patient did well overnight. Pain medication given as needed. Possible discharge on Monday. Vital signs stable and will continue to monitor. No overnight events to report.

## 2023-04-10 NOTE — PROGRESS NOTES
LOS: 4 days   Patient Care Team:  Lila Boswell APRN as PCP - General (Nurse Practitioner)    Chief Complaint:  POD#4    Subjective     Interval History:     No acute issues reported by the nursing staff overnight.  The patient reports that he over indulged at dinner, eating meatloaf, greenbeans, mashed potatoes, and cheesecake.  He reports having an episode of emesis last evening and a smaller emesis this AM.  He attributes this to overeating and the type of food he ate.  He has continues to have multiple loose BMs and has tolerated breakfast and lunch without issue.  He feels ready to go home.     Objective     Vital Signs  Temp:  [97.7 °F (36.5 °C)-98.4 °F (36.9 °C)] 98 °F (36.7 °C)  Heart Rate:  [73-91] 91  Resp:  [16-18] 18  BP: (128-137)/(70-83) 137/83    Physical Exam:  General Appearance alert, appears stated age and cooperative  Head normocephalic, without obvious abnormality and atraumatic  Eyes lids and lashes normal, conjunctivae and sclerae normal and no icterus  Lungs respirations regular, respirations even and respirations unlabored  Heart regular rhythm & normal rate  Abdomen soft non-tender, no guarding and no rebound tenderness. Incisions c/d/i with staples in place  Extremities moves extremities well, no edema, no cyanosis and no redness  Skin no bleeding, bruising or rash  Neurologic Mental Status orientated to person, place, time and situation     Results Review:    I reviewed the patient's new clinical results.      Results from last 7 days   Lab Units 04/09/23  0600 04/08/23 0513 04/07/23 0614   SODIUM mmol/L 134* 134* 135*   POTASSIUM mmol/L 4.3 3.9 4.3   CHLORIDE mmol/L 100 99 99   CO2 mmol/L 26.5 26.0 24.6   BUN mg/dL 6 7 13   CREATININE mg/dL 0.90 0.86 1.14   CALCIUM mg/dL 8.4* 8.3* 8.5*   GLUCOSE mg/dL 118* 124* 143*       Results from last 7 days   Lab Units 04/09/23  0600 04/08/23 0513 04/07/23 0614   WBC 10*3/mm3 8.25 8.57 9.79   HEMOGLOBIN g/dL 11.9* 11.4* 14.2    HEMATOCRIT % 33.4* 31.6* 38.8   PLATELETS 10*3/mm3 185 172 184         Medication Review:   Scheduled Meds:amphetamine-dextroamphetamine, 20 mg, Oral, Daily  amphetamine-dextroamphetamine XR, 10 mg, Oral, Daily  enoxaparin, 40 mg, Subcutaneous, Daily  pantoprazole, 40 mg, Intravenous, Q AM  risperiDONE, 1 mg, Oral, BID      Continuous Infusions:dextrose 5 % and lactated Ringer's, 125 mL/hr, Last Rate: 125 mL/hr (04/10/23 1206)      PRN Meds:.•  acetaminophen  •  HYDROcodone-acetaminophen  •  HYDROcodone-acetaminophen  •  Morphine **AND** naloxone  •  Morphine **AND** naloxone  •  ondansetron      Assessment & Plan       Acute appendicitis with localized peritonitis, without perforation, abscess, or gangrene    POD#4 hand assisted laparoscopic right hemicolectomy for management of acute on chronic appendicitis with a significant inflammatory phlegmon is mass adherent to the posterior lateral aspect of the cecum. He continues to have bowel function and other than the episode of emesis after apparent overindulgence last evening, he has tolerated dietary advancement.  We had a long conversation about how to appropriately advance his diet, and the importance of using caution, and avoiding overindulgence in terms of his diet.  He feels ready to be discharged home, and assures me that he is able to continue to advance his diet on his own.  He knows to follow-up with Dr. Mckeon in the office next week.  We discussed wound care, and activity restrictions.  He had no additional questions at the conclusion of our discussion.  Discharge orders have been placed.      Jessica Márquez MD  04/10/23  15:04 EDT

## 2023-04-10 NOTE — PAYOR COMM NOTE
"    UPDATED CLINICALS ON INPT ADMISSION  UR MANAGER; VIELKA CHANEL [phone] AND [phone]    Ashli Freeman (27 y.o. Male)     Date of Birth   1995    Social Security Number       Address   [address] [place] [address] [place] [address]    Home Phone   [phone]    MRN   [id number]       Anabaptism   None    Marital Status                               Admission Date   4/6/23    Admission Type   Urgent    Admitting Provider   Hasmukh Mckeon MD    Attending Provider   Hasmukh Mckeon MD    [facility]       Discharge Date       Discharge Disposition       Discharge Destination                               Attending Provider: Hasmukh Mckeon MD    Allergies: No Known Allergies    Isolation: None   Infection: None   Code Status: CPR    Ht: 180.3 cm (71\")   Wt: 139 kg (306 lb 3.5 oz)    Admission Cmt: None   Principal Problem: Acute appendicitis with localized peritonitis, without perforation, abscess, or gangrene [K35.30]                 Active Insurance as of 4/6/2023     Primary Coverage     Payor Plan Insurance Group Employer/Plan Group    [employer] [place] [id number]     Payor Plan Address Payor Plan Phone Number Payor Plan Fax Number Effective Dates    [address]   1/7/2017 - None Entered    [place] [id number]       Subscriber Name Subscriber Birth Date Member ID       ASHLI FREEMAN 1995 [id number]                 Emergency Contacts      (Rel.) Home Phone Work Phone Mobile Phone    LAZARUS FREEMAN (Spouse) [phone] -- --               Physician Progress Notes (last 24 hours)      Jessica Márquez MD at 04/09/23 1200               LOS: 3 days   Patient Care Team:  Lila Boswell APRN as PCP - General (Nurse Practitioner)    Chief Complaint:  POD#3    Subjective     Interval History:     No acute events overnight.  Tolerating full liquids.  Passing flatus and had a BM this morning.      Objective "     Vital Signs  Temp:  [98.1 °F (36.7 °C)-99.3 °F (37.4 °C)] 99.3 °F (37.4 °C)  Heart Rate:  [82-94] 83  Resp:  [18-20] 20  BP: (126-151)/(67-85) 133/67    PO 2240ml  IV 2947.9mL  UOP 600mL  BM x 1    Physical Exam:    General Appearance:    Alert, cooperative, in no acute distress   Head:    Normocephalic, without obvious abnormality, atraumatic   Eyes:            Lids and lashes normal, conjunctivae and sclerae normal, no   icterus   Ears:    Ears appear intact with no abnormalities noted   Lungs:     Respirations regular, even and unlabored    Heart:    Regular rhythm and normal rate   Abdomen:     Soft, NT, ND. Incisions clean and dry   Extremities:   Moves all extremities well, no edema, no cyanosis, no             redness   Skin:   No bleeding, bruising or rash   Neurologic:   AAOx3        Results Review:    I reviewed the patient's new clinical results.      Results from last 7 days   Lab Units 04/09/23  0600 04/08/23  0513 04/07/23  0614   SODIUM mmol/L 134* 134* 135*   POTASSIUM mmol/L 4.3 3.9 4.3   CHLORIDE mmol/L 100 99 99   CO2 mmol/L 26.5 26.0 24.6   BUN mg/dL 6 7 13   CREATININE mg/dL 0.90 0.86 1.14   CALCIUM mg/dL 8.4* 8.3* 8.5*   GLUCOSE mg/dL 118* 124* 143*       Results from last 7 days   Lab Units 04/09/23  0600 04/08/23  0513 04/07/23  0614   WBC 10*3/mm3 8.25 8.57 9.79   HEMOGLOBIN g/dL 11.9* 11.4* 14.2   HEMATOCRIT % 33.4* 31.6* 38.8   PLATELETS 10*3/mm3 185 172 184         Medication Review:   Scheduled Meds:amphetamine-dextroamphetamine, 20 mg, Oral, Daily  amphetamine-dextroamphetamine XR, 10 mg, Oral, Daily  enoxaparin, 40 mg, Subcutaneous, Daily  pantoprazole, 40 mg, Intravenous, Q AM  risperiDONE, 1 mg, Oral, BID      Continuous Infusions:dextrose 5 % and lactated Ringer's, 125 mL/hr, Last Rate: 125 mL/hr (04/09/23 0545)      PRN Meds:.•  acetaminophen  •  HYDROcodone-acetaminophen  •  HYDROcodone-acetaminophen  •  Morphine **AND** naloxone  •  Morphine **AND** naloxone  •   ondansetron      Assessment & Plan       Acute appendicitis with localized peritonitis, without perforation, abscess, or gangrene      POD#3 hand assisted laparoscopic right hemicolectomy for management of acute on chronic appendicitis with a significant inflammatory phlegmon is mass adherent to the posterior lateral aspect of the cecum. He continues to improve and has had return of bowel function.  I have advanced him to a GI soft, low residue diet.  We dicussed discharge home and he does not feel ready for discharge. We discussed the importance of continued frequent ambulation.  I am hopeful that he will be appropriate for discharge home tomorrow.      Jessica Márquez MD  04/09/23  13:07 EDT          Electronically signed by Jessica Márquez MD at 04/09/23 1317       Consult Notes (last 24 hours)  Notes from 04/09/23 1130 through 04/10/23 1130   No notes of this type exist for this encounter.

## 2023-04-10 NOTE — CASE MANAGEMENT/SOCIAL WORK
Case Management Discharge Note                Selected Continued Care - Admitted Since 4/6/2023     Destination    No services have been selected for the patient.              Durable Medical Equipment    No services have been selected for the patient.              Dialysis/Infusion    No services have been selected for the patient.              Home Medical Care    No services have been selected for the patient.              Therapy    No services have been selected for the patient.              Community Resources    No services have been selected for the patient.              Community & [facility]    No services have been selected for the patient.                  Transportation Services  Private: Car    Final Discharge Disposition Code: 01 - home or self-care

## 2023-04-10 NOTE — DISCHARGE SUMMARY
[facility]   DISCHARGE SUMMARY      Name:  Prashanth Freeman   Age:  27 y.o.  Sex:  male  :  1995  MRN:  [id number]   Visit Number:  [id number]  Primary Care Physician:  Lila Boswell APRN  Date of Discharge:  4/10/2023  Admission Date:  2023      Discharge Diagnosis:       Patient Active Problem List   Diagnosis   • Acute appendicitis with localized peritonitis, without perforation, abscess, or gangrene       Presenting Problem/History of Present Illness:    Acute appendicitis with localized peritonitis, without perforation, abscess, or gangrene [K35.30]         Consults:     Consults     No orders found from 3/8/2023 to 2023.          Procedures Performed:    Procedure(s):  HAND ASSISTED APPENDECTOMY WITH RIGHT COLON RESECTION         History of presenting illness:    Patient presented with appendicitis to [facility] and was transferred here for presumed appendectomy.    Hospital Course:    Patient underwent an appendectomy on 2023.  Patient had dense phlegmon and required hand assist right hemicolectomy to remove this area.  Please see the operative note for more detail regarding this.  Patient had no perioperative or postoperative complications.  He was kept for routine supportive care and was slowly advanced as his bowel function returned to regular diet which she was tolerating on discharge.  He was passing flatus and had bowel movements.  He remained afebrile and stable for discharge on April 10, 2023.    Vital Signs:    Temp:  [97.7 °F (36.5 °C)-98.4 °F (36.9 °C)] 98 °F (36.7 °C)  Heart Rate:  [73-91] 91  Resp:  [16-18] 18  BP: (128-137)/(70-83) 137/83    Physical Exam:    In general: Laying in bed comfortably in no acute distress  Abdomen: Soft and nondistended.  Wound without evidence of infection.  Heart: Regular rate and rhythm    Pertinent Lab Results:     Results from last 7 days   Lab Units 23  0600 23  0513 23  0614    SODIUM mmol/L 134* 134* 135*   POTASSIUM mmol/L 4.3 3.9 4.3   CHLORIDE mmol/L 100 99 99   CO2 mmol/L 26.5 26.0 24.6   BUN mg/dL 6 7 13   CREATININE mg/dL 0.90 0.86 1.14   CALCIUM mg/dL 8.4* 8.3* 8.5*   GLUCOSE mg/dL 118* 124* 143*     Results from last 7 days   Lab Units 04/09/23  0600 04/08/23  0513 04/07/23  0614   WBC 10*3/mm3 8.25 8.57 9.79   HEMOGLOBIN g/dL 11.9* 11.4* 14.2   HEMATOCRIT % 33.4* 31.6* 38.8   PLATELETS 10*3/mm3 185 172 184         No results found for: BLOODCX, URINECX, WOUNDCX, MRSACX      Pertinent Radiology Results:    Imaging Results (All)     None          Condition on Discharge:      Stable    Code status during the hospital stay:    <no information>    Discharge Disposition:        Discharge Medication:       Discharge Medications      New Medications      Instructions Start Date   ibuprofen 800 MG tablet  Commonly known as: ADVIL,MOTRIN   800 mg, Oral, Every 6 Hours PRN         Changes to Medications      Instructions Start Date   amphetamine-dextroamphetamine 20 MG tablet  Commonly known as: ADDERALL  What changed: Another medication with the same name was removed. Continue taking this medication, and follow the directions you see here.   20 mg, Oral, Daily         Continue These Medications      Instructions Start Date   cyanocobalamin 1000 MCG tablet  Commonly known as: VITAMIN B-12   100 mcg, Oral, Daily      risperiDONE 0.5 MG tablet  Commonly known as: risperDAL   0.5 mg, Oral, 2 Times Daily, 1-2 tablets BID      venlafaxine 37.5 MG tablet  Commonly known as: EFFEXOR   37.5 mg, Oral, Daily      Vitamin D 50 MCG (2000 UT) capsule   2,000 Units, Oral, Daily         ASK your doctor about these medications      Instructions Start Date   HYDROcodone-acetaminophen 5-325 MG per tablet  Commonly known as: NORCO  Ask about: Should I take this medication?   1-2 tablets, Oral, Every 4 Hours PRN      ondansetron 4 MG tablet  Commonly known as: Zofran  Ask about: Should I take this  medication?   4 mg, Oral, Every 8 Hours PRN             Discharge Diet:     Diet Instructions     Diet: Regular/House Diet; Regular Texture (IDDSI 7); Thin (IDDSI 0)      Discharge Diet: Regular/House Diet    Texture: Regular Texture (IDDSI 7)    Fluid Consistency: Thin (IDDSI 0)          Activity at Discharge:     Activity Instructions     Activity as Tolerated      10 to 15 pound weight restriction otherwise may ambulate as tolerated.  May shower          Follow-up Appointments:    No future appointments.  Additional Instructions for the Follow-ups that You Need to Schedule     Discharge Follow-up with Specified Provider: Dr. Mckeon; 1 Week   As directed      To: Dr. Mckeon    Follow Up: 1 Week               Test Results Pending at Discharge:           Hasmukh Mckeon MD  04/10/23  14:39 EDT

## 2023-04-11 ENCOUNTER — READMISSION MANAGEMENT (OUTPATIENT)
Dept: CALL CENTER | Facility: HOSPITAL | Age: 28
End: 2023-04-11
Payer: COMMERCIAL

## 2023-04-11 NOTE — PAYOR COMM NOTE
"  D/C SUMMARY  7UR MANAGER; VIELKA CHANEL [phone] AND [phone]      Ashli Freeman (27 y.o. Male)     Date of Birth   1995    Social Security Number       Address   [address] [place] [address]    Home Phone   [phone]    MRN   [id number]       Bahai   None    Marital Status                               Admission Date   4/6/23    Admission Type   Urgent    Admitting Provider   Hasmukh Mckeon MD    Attending Provider       [facility]       Discharge Date   4/10/2023    Discharge Disposition   Home or Self Care    Discharge Destination                               Attending Provider: (none)   Allergies: No Known Allergies    Isolation: None   Infection: None   Code Status: Prior    Ht: 180.3 cm (71\")   Wt: 139 kg (306 lb 3.5 oz)    Admission Cmt: None   Principal Problem: Acute appendicitis with localized peritonitis, without perforation, abscess, or gangrene [K35.30]                 Active Insurance as of 4/6/2023     Primary Coverage     Payor Plan Insurance Group Employer/Plan Group    [facility] [place] [id number]     Payor Plan Address Payor Plan Phone Number Payor Plan Fax Number Effective Dates    [address]   1/7/2017 - None Entered    [place] [address]       Subscriber Name Subscriber Birth Date Member ID       ASHLI FREEMAN 1995 [id number]                 Emergency Contacts      (Rel.) Home Phone Work Phone Mobile Phone    LAZARUS FREEMAN (Spouse) [phone] -- --               Physician Progress Notes (last 24 hours)      Jessica Márquez MD at 04/10/23 1504               LOS: 4 days   Patient Care Team:  Lila Boswell APRN as PCP - General (Nurse Practitioner)    Chief Complaint:  POD#4    Subjective     Interval History:     No acute issues reported by the nursing staff overnight.  The patient reports that he over indulged at dinner, eating meatloaf, greenbeans, " mashed potatoes, and cheesecake.  He reports having an episode of emesis last evening and a smaller emesis this AM.  He attributes this to overeating and the type of food he ate.  He has continues to have multiple loose BMs and has tolerated breakfast and lunch without issue.  He feels ready to go home.     Objective     Vital Signs  Temp:  [97.7 °F (36.5 °C)-98.4 °F (36.9 °C)] 98 °F (36.7 °C)  Heart Rate:  [73-91] 91  Resp:  [16-18] 18  BP: (128-137)/(70-83) 137/83    Physical Exam:  General Appearance alert, appears stated age and cooperative  Head normocephalic, without obvious abnormality and atraumatic  Eyes lids and lashes normal, conjunctivae and sclerae normal and no icterus  Lungs respirations regular, respirations even and respirations unlabored  Heart regular rhythm & normal rate  Abdomen soft non-tender, no guarding and no rebound tenderness. Incisions c/d/i with staples in place  Extremities moves extremities well, no edema, no cyanosis and no redness  Skin no bleeding, bruising or rash  Neurologic Mental Status orientated to person, place, time and situation     Results Review:    I reviewed the patient's new clinical results.      Results from last 7 days   Lab Units 04/09/23  0600 04/08/23 0513 04/07/23 0614   SODIUM mmol/L 134* 134* 135*   POTASSIUM mmol/L 4.3 3.9 4.3   CHLORIDE mmol/L 100 99 99   CO2 mmol/L 26.5 26.0 24.6   BUN mg/dL 6 7 13   CREATININE mg/dL 0.90 0.86 1.14   CALCIUM mg/dL 8.4* 8.3* 8.5*   GLUCOSE mg/dL 118* 124* 143*       Results from last 7 days   Lab Units 04/09/23  0600 04/08/23 0513 04/07/23 0614   WBC 10*3/mm3 8.25 8.57 9.79   HEMOGLOBIN g/dL 11.9* 11.4* 14.2   HEMATOCRIT % 33.4* 31.6* 38.8   PLATELETS 10*3/mm3 185 172 184         Medication Review:   Scheduled Meds:amphetamine-dextroamphetamine, 20 mg, Oral, Daily  amphetamine-dextroamphetamine XR, 10 mg, Oral, Daily  enoxaparin, 40 mg, Subcutaneous, Daily  pantoprazole, 40 mg, Intravenous, Q AM  risperiDONE, 1 mg,  Oral, BID      Continuous Infusions:dextrose 5 % and lactated Ringer's, 125 mL/hr, Last Rate: 125 mL/hr (04/10/23 1206)      PRN Meds:.•  acetaminophen  •  HYDROcodone-acetaminophen  •  HYDROcodone-acetaminophen  •  Morphine **AND** naloxone  •  Morphine **AND** naloxone  •  ondansetron      Assessment & Plan       Acute appendicitis with localized peritonitis, without perforation, abscess, or gangrene    POD#4 hand assisted laparoscopic right hemicolectomy for management of acute on chronic appendicitis with a significant inflammatory phlegmon is mass adherent to the posterior lateral aspect of the cecum. He continues to have bowel function and other than the episode of emesis after apparent overindulgence last evening, he has tolerated dietary advancement.  We had a long conversation about how to appropriately advance his diet, and the importance of using caution, and avoiding overindulgence in terms of his diet.  He feels ready to be discharged home, and assures me that he is able to continue to advance his diet on his own.  He knows to follow-up with Dr. Mckeon in the office next week.  We discussed wound care, and activity restrictions.  He had no additional questions at the conclusion of our discussion.  Discharge orders have been placed.      Jessica Márquez MD  04/10/23  15:04 EDT          Electronically signed by Jessica Márquez MD at 04/10/23 1712          Discharge Summary      Hasmukh Mckeon MD at 04/10/23 1439              [facility]   DISCHARGE SUMMARY      Name:  Prashanth Freeman   Age:  27 y.o.  Sex:  male  :  1995  MRN:  [id number]   Visit Number:  [id number]  Primary Care Physician:  Lila Boswell APRN  Date of Discharge:  4/10/2023  Admission Date:  2023      Discharge Diagnosis:       Patient Active Problem List   Diagnosis   • Acute appendicitis with localized peritonitis, without perforation, abscess, or gangrene       Presenting Problem/History of  Present Illness:    Acute appendicitis with localized peritonitis, without perforation, abscess, or gangrene [K35.30]         Consults:     Consults     No orders found from 3/8/2023 to 4/7/2023.          Procedures Performed:    Procedure(s):  HAND ASSISTED APPENDECTOMY WITH RIGHT COLON RESECTION         History of presenting illness:    Patient presented with appendicitis to [facility] and was transferred here for presumed appendectomy.    Hospital Course:    Patient underwent an appendectomy on 4/6/2023.  Patient had dense phlegmon and required hand assist right hemicolectomy to remove this area.  Please see the operative note for more detail regarding this.  Patient had no perioperative or postoperative complications.  He was kept for routine supportive care and was slowly advanced as his bowel function returned to regular diet which she was tolerating on discharge.  He was passing flatus and had bowel movements.  He remained afebrile and stable for discharge on April 10, 2023.    Vital Signs:    Temp:  [97.7 °F (36.5 °C)-98.4 °F (36.9 °C)] 98 °F (36.7 °C)  Heart Rate:  [73-91] 91  Resp:  [16-18] 18  BP: (128-137)/(70-83) 137/83    Physical Exam:    In general: Laying in bed comfortably in no acute distress  Abdomen: Soft and nondistended.  Wound without evidence of infection.  Heart: Regular rate and rhythm    Pertinent Lab Results:     Results from last 7 days   Lab Units 04/09/23  0600 04/08/23 0513 04/07/23 0614   SODIUM mmol/L 134* 134* 135*   POTASSIUM mmol/L 4.3 3.9 4.3   CHLORIDE mmol/L 100 99 99   CO2 mmol/L 26.5 26.0 24.6   BUN mg/dL 6 7 13   CREATININE mg/dL 0.90 0.86 1.14   CALCIUM mg/dL 8.4* 8.3* 8.5*   GLUCOSE mg/dL 118* 124* 143*     Results from last 7 days   Lab Units 04/09/23  0600 04/08/23  0513 04/07/23 0614   WBC 10*3/mm3 8.25 8.57 9.79   HEMOGLOBIN g/dL 11.9* 11.4* 14.2   HEMATOCRIT % 33.4* 31.6* 38.8   PLATELETS 10*3/mm3 185 172 184         No results found for: BLOODCX,  URINECX, WOUNDCX, MRSACX      Pertinent Radiology Results:    Imaging Results (All)     None          Condition on Discharge:      Stable    Code status during the hospital stay:    <no information>    Discharge Disposition:        Discharge Medication:       Discharge Medications      New Medications      Instructions Start Date   ibuprofen 800 MG tablet  Commonly known as: ADVIL,MOTRIN   800 mg, Oral, Every 6 Hours PRN         Changes to Medications      Instructions Start Date   amphetamine-dextroamphetamine 20 MG tablet  Commonly known as: ADDERALL  What changed: Another medication with the same name was removed. Continue taking this medication, and follow the directions you see here.   20 mg, Oral, Daily         Continue These Medications      Instructions Start Date   cyanocobalamin 1000 MCG tablet  Commonly known as: VITAMIN B-12   100 mcg, Oral, Daily      risperiDONE 0.5 MG tablet  Commonly known as: risperDAL   0.5 mg, Oral, 2 Times Daily, 1-2 tablets BID      venlafaxine 37.5 MG tablet  Commonly known as: EFFEXOR   37.5 mg, Oral, Daily      Vitamin D 50 MCG (2000 UT) capsule   2,000 Units, Oral, Daily         ASK your doctor about these medications      Instructions Start Date   HYDROcodone-acetaminophen 5-325 MG per tablet  Commonly known as: NORCO  Ask about: Should I take this medication?   1-2 tablets, Oral, Every 4 Hours PRN      ondansetron 4 MG tablet  Commonly known as: Zofran  Ask about: Should I take this medication?   4 mg, Oral, Every 8 Hours PRN             Discharge Diet:     Diet Instructions     Diet: Regular/House Diet; Regular Texture (IDDSI 7); Thin (IDDSI 0)      Discharge Diet: Regular/House Diet    Texture: Regular Texture (IDDSI 7)    Fluid Consistency: Thin (IDDSI 0)          Activity at Discharge:     Activity Instructions     Activity as Tolerated      10 to 15 pound weight restriction otherwise may ambulate as tolerated.  May shower          Follow-up Appointments:    No future  appointments.  Additional Instructions for the Follow-ups that You Need to Schedule     Discharge Follow-up with Specified Provider: Dr. Mckeon; 1 Week   As directed      To: Dr. Mckeon    Follow Up: 1 Week               Test Results Pending at Discharge:           Hasmukh Mckeon MD  04/10/23  14:39 EDT          Electronically signed by Hasmukh Mckeon MD at 04/10/23 2775

## 2023-04-11 NOTE — OUTREACH NOTE
Prep Survey    Flowsheet Row Responses   [facility] patient discharged from? [facility]   Is LACE score < 7 ? No   Eligibility Readm Mgmt   Discharge diagnosis Acute appendicitis with localized peritonitis, without perforation, abscess, or gangrene   Does the patient have one of the following disease processes/diagnoses(primary or secondary)? General Surgery   Does the patient have Home health ordered? No   Is there a DME ordered? No   Prep survey completed? Yes          Lili CORREA - Registered Nurse

## 2023-04-13 ENCOUNTER — READMISSION MANAGEMENT (OUTPATIENT)
Dept: CALL CENTER | Facility: HOSPITAL | Age: 28
End: 2023-04-13
Payer: COMMERCIAL

## 2023-04-13 NOTE — OUTREACH NOTE
General Surgery Week 1 Survey    Flowsheet Row Responses   [facility] patient discharged from? [facility]   Does the patient have one of the following disease processes/diagnoses(primary or secondary)? General Surgery   Week 1 attempt successful? Yes   Call start time 1125   Call end time 1135   Meds reviewed with patient/caregiver? Yes   Is the patient having any side effects they believe may be caused by any medication additions or changes? No   Does the patient have all medications related to this admission filled (includes all antibiotics, pain medications, etc.) Yes   Is the patient taking all medications as directed (includes completed medication regime)? Yes   Does the patient have a follow up appointment scheduled with their surgeon? Yes   Has the patient kept scheduled appointments due by today? N/A   Comments Surgeon appt 04/18/23, PCP appt 04/21/23.   Has home health visited the patient within 72 hours of discharge? N/A   Psychosocial issues? No   Did the patient receive a copy of their discharge instructions? Yes   Nursing interventions Reviewed instructions with patient   What is the patient's perception of their health status since discharge? Improving   Nursing interventions Nurse provided patient education   Is the patient /caregiver able to teach back basic post-op care? Continue use of incentive spirometry at least 1 week post discharge, Practice 'cough and deep breath', Drive as instructed by MD in discharge instructions, Take showers only when approved by MD-sponge bathe until then, No tub bath, swimming, or hot tub until instructed by MD, Keep incision areas clean,dry and protected, Do not remove steri-strips, Lifting as instructed by MD in discharge instructions   Is the patient/caregiver able to teach back signs and symptoms of incisional infection? Increased redness, swelling or pain at the incisonal site, Increased drainage or bleeding, Incisional warmth, Pus or odor from incision, Fever  "  Is the patient/caregiver able to teach back steps to recovery at home? Set small, achievable goals for return to baseline health, Rest and rebuild strength, gradually increase activity, Practice good oral hygiene, Eat a well-balance diet   If the patient is a current smoker, are they able to teach back resources for cessation? Smoking cessation medications, Smoking cessation classes, Smoking cessation support groups, 3-524-LlivLkd   Is the patient/caregiver able to teach back the hierarchy of who to call/visit for symptoms/problems? PCP, Specialist, Home health nurse, Urgent Care, ED, 911 Yes   Week 1 call completed? Yes   Is the patient interested in additional calls from an ambulatory ?  NOTE:  applies to high risk patients requiring additional follow-up. No   Wrap up additional comments Patient states is improving. Denies any s/s of infection at incision. States stools are still \"runny\", and continues with eating small, easy to digest foods. Denies any needs today.          Marjan MORRISON - Registered Nurse  "

## 2023-04-14 NOTE — PROGRESS NOTES
"1914Patient: Prashanth Freeman    YOB: 1995    Date: 04/18/2023    Primary Care Provider: Lila Boswell APRN    Chief Complaint   Patient presents with   • Post-op Follow-up     Lap Appy       History of present illness:  I saw the patient in the office today as a followup from their recent laparoscopic appendectomy.  They state that they have done well and are having no complaints.    Vital Signs:   Vitals:    04/18/23 0941   BP: 130/82   Pulse: 90   Temp: 97 °F (36.1 °C)   SpO2: 98%   Weight: 132 kg (291 lb)   Height: 180.3 cm (71\")       Physical Exam:   General Appearance:    Alert, cooperative, in no acute distress   Abdomen:     no masses, no organomegaly, soft non-tender, non-distended, no guarding, wounds are well healed     Assessment / Plan:    1. Postoperative visit        Patient did well after his hand-assisted right hemicolectomy.  Fortunately there was no evidence of carcinoma and findings were consistent with severe appendicitis.  I gave him activity instructions.  I would like him to avoid lifting more than 15 pounds for the next month.  Otherwise he will follow-up with me as needed.      Electronically signed by Hasmukh Mckeon MD  04/18/23                    "

## 2023-04-18 ENCOUNTER — PATIENT ROUNDING (BHMG ONLY) (OUTPATIENT)
Dept: SURGERY | Facility: CLINIC | Age: 28
End: 2023-04-18
Payer: COMMERCIAL

## 2023-04-18 ENCOUNTER — OFFICE VISIT (OUTPATIENT)
Dept: SURGERY | Facility: CLINIC | Age: 28
End: 2023-04-18
Payer: COMMERCIAL

## 2023-04-18 VITALS
HEIGHT: 71 IN | DIASTOLIC BLOOD PRESSURE: 82 MMHG | TEMPERATURE: 97 F | BODY MASS INDEX: 40.74 KG/M2 | WEIGHT: 291 LBS | HEART RATE: 90 BPM | SYSTOLIC BLOOD PRESSURE: 130 MMHG | OXYGEN SATURATION: 98 %

## 2023-04-18 DIAGNOSIS — Z48.89 POSTOPERATIVE VISIT: Primary | ICD-10-CM

## 2023-04-18 PROCEDURE — 99024 POSTOP FOLLOW-UP VISIT: CPT | Performed by: SURGERY

## 2023-04-18 RX ORDER — VENLAFAXINE HYDROCHLORIDE 37.5 MG/1
CAPSULE, EXTENDED RELEASE ORAL
COMMUNITY
Start: 2023-03-22

## 2023-04-18 RX ORDER — DEXTROAMPHETAMINE SACCHARATE, AMPHETAMINE ASPARTATE MONOHYDRATE, DEXTROAMPHETAMINE SULFATE AND AMPHETAMINE SULFATE 5; 5; 5; 5 MG/1; MG/1; MG/1; MG/1
20 CAPSULE, EXTENDED RELEASE ORAL EVERY MORNING
COMMUNITY
Start: 2023-03-25

## 2023-04-18 NOTE — PROGRESS NOTES
April 18, 2023    Hello, may I speak with Prashanth Freeman?    My name is Lorelei       I am  with [facility] GENERAL SURGERY [address]  [facility] [address] [phone].    Before we get started may I verify your date of birth? 1995    I am calling to officially welcome you to our practice and ask about your recent visit. Is this a good time to talk? yes    Tell me about your visit with us. What things went well?  Great       We're always looking for ways to make our patients' experiences even better. Do you have recommendations on ways we may improve?  no    Overall were you satisfied with your first visit to our practice? yes       I appreciate you taking the time to speak with me today. Is there anything else I can do for you? no      Thank you, and have a great day.

## 2023-04-21 ENCOUNTER — OFFICE VISIT (OUTPATIENT)
Dept: PRIMARY CARE CLINIC | Age: 28
End: 2023-04-21

## 2023-04-21 VITALS
SYSTOLIC BLOOD PRESSURE: 118 MMHG | DIASTOLIC BLOOD PRESSURE: 77 MMHG | TEMPERATURE: 97.1 F | HEIGHT: 71 IN | RESPIRATION RATE: 16 BRPM | HEART RATE: 85 BPM | BODY MASS INDEX: 38.92 KG/M2 | OXYGEN SATURATION: 96 % | WEIGHT: 278 LBS

## 2023-04-21 DIAGNOSIS — K35.21 ACUTE APPENDICITIS WITH GENERALIZED PERITONITIS AND ABSCESS, WITHOUT GANGRENE, UNSPECIFIED WHETHER PERFORATION PRESENT: Primary | ICD-10-CM

## 2023-04-21 ASSESSMENT — PATIENT HEALTH QUESTIONNAIRE - PHQ9
SUM OF ALL RESPONSES TO PHQ9 QUESTIONS 1 & 2: 0
SUM OF ALL RESPONSES TO PHQ QUESTIONS 1-9: 0
1. LITTLE INTEREST OR PLEASURE IN DOING THINGS: 0
SUM OF ALL RESPONSES TO PHQ QUESTIONS 1-9: 0
2. FEELING DOWN, DEPRESSED OR HOPELESS: 0

## 2023-07-28 ENCOUNTER — OFFICE VISIT (OUTPATIENT)
Dept: PRIMARY CARE CLINIC | Age: 28
End: 2023-07-28
Payer: COMMERCIAL

## 2023-07-28 ENCOUNTER — TELEPHONE (OUTPATIENT)
Dept: PRIMARY CARE CLINIC | Age: 28
End: 2023-07-28

## 2023-07-28 VITALS
DIASTOLIC BLOOD PRESSURE: 72 MMHG | RESPIRATION RATE: 14 BRPM | BODY MASS INDEX: 41.72 KG/M2 | WEIGHT: 298 LBS | TEMPERATURE: 97.5 F | SYSTOLIC BLOOD PRESSURE: 128 MMHG | HEART RATE: 83 BPM | OXYGEN SATURATION: 97 % | HEIGHT: 71 IN

## 2023-07-28 DIAGNOSIS — M62.838 NECK MUSCLE SPASM: Primary | ICD-10-CM

## 2023-07-28 DIAGNOSIS — M25.512 ACUTE PAIN OF BOTH SHOULDERS: ICD-10-CM

## 2023-07-28 DIAGNOSIS — M25.511 ACUTE PAIN OF BOTH SHOULDERS: ICD-10-CM

## 2023-07-28 DIAGNOSIS — B96.89 ACUTE BACTERIAL PHARYNGITIS: Primary | ICD-10-CM

## 2023-07-28 DIAGNOSIS — J02.8 ACUTE BACTERIAL PHARYNGITIS: Primary | ICD-10-CM

## 2023-07-28 PROCEDURE — 99213 OFFICE O/P EST LOW 20 MIN: CPT | Performed by: NURSE PRACTITIONER

## 2023-07-28 RX ORDER — AMOXICILLIN AND CLAVULANATE POTASSIUM 875; 125 MG/1; MG/1
1 TABLET, FILM COATED ORAL 2 TIMES DAILY
Qty: 14 TABLET | Refills: 0 | Status: SHIPPED | OUTPATIENT
Start: 2023-07-28 | End: 2023-08-04

## 2023-07-28 RX ORDER — DEXTROAMPHETAMINE SACCHARATE, AMPHETAMINE ASPARTATE, DEXTROAMPHETAMINE SULFATE AND AMPHETAMINE SULFATE 5; 5; 5; 5 MG/1; MG/1; MG/1; MG/1
TABLET ORAL
COMMUNITY
Start: 2023-06-23

## 2023-07-28 ASSESSMENT — ENCOUNTER SYMPTOMS
EYES NEGATIVE: 1
BACK PAIN: 1
ALLERGIC/IMMUNOLOGIC NEGATIVE: 1
GASTROINTESTINAL NEGATIVE: 1
RESPIRATORY NEGATIVE: 1

## 2023-08-17 NOTE — PROGRESS NOTES
Chief Complaint   Patient presents with    Medication Management     OCD       Have you seen any other physician or provider since your last visit no    Have you had any other diagnostic tests since your last visit? no    Have you changed or stopped any medications since your last visit? no         SUBJECTIVE:    Patient ID:Hever Squires is a 27 y.o. male. Chief Complaint   Patient presents with    Medication Management     OCD     HPI:  Patient is here to follow up on medication management for OCD. He states that the medication has really helped. He feels clear minded more. He is feeling more productive and taking his vit B12 and vit D regularly. He feels like his is doing a lot better both at work and at home. He has lost some weight but isn't trying yet. He reports continuing to smoke. He has not started counseling but is still willing to try to get some counseling. Patient's medications, allergies, past medical, surgical, social and family histories were reviewed and updated as appropriate. Review of Systems   Constitutional: Negative. HENT: Negative. Eyes: Negative. Respiratory: Negative. Cardiovascular: Negative. Gastrointestinal: Negative. Endocrine: Negative. Genitourinary: Negative. Musculoskeletal: Negative. Skin: Negative. Allergic/Immunologic: Negative. Neurological: Negative. Hematological: Negative. Psychiatric/Behavioral: Positive for behavioral problems. The patient is nervous/anxious. OBJECTIVE:  /70 (Site: Left Upper Arm, Position: Sitting, Cuff Size: Large Adult)   Pulse 72   Temp 97.6 °F (36.4 °C) (Temporal)   Resp 16   Ht 5' 11\" (1.803 m)   Wt 287 lb 9.6 oz (130.5 kg)   SpO2 98% Comment: room air  BMI 40.11 kg/m²    Physical Exam  Vitals and nursing note reviewed. Constitutional:       Appearance: He is well-developed. HENT:      Head: Normocephalic and atraumatic.    Eyes:      Conjunctiva/sclera: Conjunctivae normal.      Pupils: Pupils are equal, round, and reactive to light. Neck:      Thyroid: No thyromegaly. Vascular: No JVD. Cardiovascular:      Rate and Rhythm: Normal rate and regular rhythm. Heart sounds: No murmur heard. No friction rub. No gallop. Pulmonary:      Effort: Pulmonary effort is normal. No respiratory distress. Breath sounds: Normal breath sounds. No wheezing or rales. Abdominal:      General: Bowel sounds are normal. There is no distension. Palpations: Abdomen is soft. Tenderness: There is no abdominal tenderness. There is no guarding. Musculoskeletal:         General: No tenderness. Cervical back: Normal range of motion and neck supple. Skin:     General: Skin is warm and dry. Findings: No rash. Neurological:      Mental Status: He is alert and oriented to person, place, and time. Psychiatric:         Mood and Affect: Mood is anxious. Judgment: Judgment normal.         No results found for requested labs within last 30 days. LDL Calculated (mg/dL)   Date Value   11/01/2021 71         Lab Results   Component Value Date    WBC 6.2 11/01/2021    NEUTROABS 2.4 03/13/2020    HGB 15.8 11/01/2021    HCT 45.4 11/01/2021    MCV 92.3 11/01/2021     11/01/2021       Lab Results   Component Value Date    TSH 1.48 11/01/2021         ASSESSMENT/PLAN:     1. Obsessive thinking  Doing well continue on the current dose of the Luvox. I have stressed the importance of getting into counseling. 2. Toenail fungus  He is still continuing treatment on the Lamisil will need to check liver functions when he completes this dose.        Written by Dara DSOUZA, acting as a scribe for Josué Sanchez on 3/28/2022 at 11:16 AM. sitting sitting

## 2023-11-15 ENCOUNTER — OFFICE VISIT (OUTPATIENT)
Dept: PRIMARY CARE CLINIC | Age: 28
End: 2023-11-15
Payer: COMMERCIAL

## 2023-11-15 ENCOUNTER — HOSPITAL ENCOUNTER (OUTPATIENT)
Facility: HOSPITAL | Age: 28
Discharge: HOME | End: 2023-11-15
Payer: COMMERCIAL

## 2023-11-15 VITALS
DIASTOLIC BLOOD PRESSURE: 85 MMHG | SYSTOLIC BLOOD PRESSURE: 131 MMHG | HEART RATE: 71 BPM | WEIGHT: 315 LBS | BODY MASS INDEX: 44.44 KG/M2 | OXYGEN SATURATION: 98 %

## 2023-11-15 DIAGNOSIS — Z57.9 OCCUPATIONAL EXPOSURE IN WORKPLACE: ICD-10-CM

## 2023-11-15 DIAGNOSIS — Z13.29 THYROID DISORDER SCREEN: ICD-10-CM

## 2023-11-15 DIAGNOSIS — E53.9 VITAMIN B DEFICIENCY: ICD-10-CM

## 2023-11-15 DIAGNOSIS — E55.9 VITAMIN D DEFICIENCY: ICD-10-CM

## 2023-11-15 DIAGNOSIS — B35.1 TOENAIL FUNGUS: ICD-10-CM

## 2023-11-15 DIAGNOSIS — E78.5 BORDERLINE HYPERLIPIDEMIA: ICD-10-CM

## 2023-11-15 DIAGNOSIS — E66.01 CLASS 3 SEVERE OBESITY DUE TO EXCESS CALORIES WITH BODY MASS INDEX (BMI) OF 40.0 TO 44.9 IN ADULT, UNSPECIFIED WHETHER SERIOUS COMORBIDITY PRESENT (HCC): Primary | ICD-10-CM

## 2023-11-15 LAB
ERYTHROCYTE [DISTWIDTH] IN BLOOD BY AUTOMATED COUNT: 11.6 % (ref 11–16)
HBV SURFACE AB SERPL IA-ACNC: <3.5 MIU/ML
HCT VFR BLD AUTO: 41.9 % (ref 40–54)
HGB BLD-MCNC: 15.2 G/DL (ref 13–18)
MCH RBC QN AUTO: 32.6 PG (ref 27–32)
MCHC RBC AUTO-ENTMCNC: 36.3 G/DL (ref 31–35)
MCV RBC AUTO: 89.9 FL (ref 80–100)
PLATELET # BLD AUTO: 207 K/UL (ref 150–400)
PMV BLD AUTO: 9.8 FL (ref 6–10)
RBC # BLD AUTO: 4.66 M/UL (ref 4.5–6)
WBC # BLD AUTO: 7.3 K/UL (ref 4–11)

## 2023-11-15 PROCEDURE — 82306 VITAMIN D 25 HYDROXY: CPT

## 2023-11-15 PROCEDURE — 86706 HEP B SURFACE ANTIBODY: CPT

## 2023-11-15 PROCEDURE — 86708 HEPATITIS A ANTIBODY: CPT

## 2023-11-15 PROCEDURE — 99213 OFFICE O/P EST LOW 20 MIN: CPT | Performed by: NURSE PRACTITIONER

## 2023-11-15 PROCEDURE — 82607 VITAMIN B-12: CPT

## 2023-11-15 PROCEDURE — 80053 COMPREHEN METABOLIC PANEL: CPT

## 2023-11-15 PROCEDURE — 85027 COMPLETE CBC AUTOMATED: CPT

## 2023-11-15 PROCEDURE — 80061 LIPID PANEL: CPT

## 2023-11-15 PROCEDURE — 84443 ASSAY THYROID STIM HORMONE: CPT

## 2023-11-15 PROCEDURE — 82746 ASSAY OF FOLIC ACID SERUM: CPT

## 2023-11-15 PROCEDURE — 36415 COLL VENOUS BLD VENIPUNCTURE: CPT

## 2023-11-15 SDOH — HEALTH STABILITY - PHYSICAL HEALTH: OCCUPATIONAL EXPOSURE TO UNSPECIFIED RISK FACTOR: Z57.9

## 2023-11-15 ASSESSMENT — ENCOUNTER SYMPTOMS
ABDOMINAL PAIN: 0
NAUSEA: 0
COUGH: 0
SHORTNESS OF BREATH: 0
SINUS PRESSURE: 0
VOMITING: 0
BACK PAIN: 0
WHEEZING: 0
EYE DISCHARGE: 0

## 2023-11-15 NOTE — PROGRESS NOTES
Chief Complaint   Patient presents with    Check-Up     Needs Forms Completed and Sleep Study       Have you seen any other physician or provider since your last visit no    Have you had any other diagnostic tests since your last visit? yes - Nose surgery    Have you changed or stopped any medications since your last visit? no     I have recommended that this patient have a flu shot but he declines at this time. I have discussed the risks and benefits of this examination with him. The patient verbalizes understanding. SUBJECTIVE:    Patient ID: Faith Wilder is a 25 y.o.male. Chief Complaint   Patient presents with    Check-Up     Needs Forms Completed and Sleep Study         HPI:  Patient is here today for a check up. He recently completed a DOT physical and is here for a referral for sleep study. Patient denies symptoms and states he needs it completed due to his body mass. He did bring some forms for completion as well and would like to discuss getting labs drawn. Neck circumfrence is 18 1/2 inches. He has started a new job and would like to receive a Hep A and Hep B injection if needed. He is still vaping and sometimes still smoking as well. He is now living in [place]. [place]. He thinks the medication is helping his mental health. Trey Dey did rhinnoplasty already  Patient's medications, allergies, past medical, surgical, social and family histories were reviewed and updated as appropriate in electronic medical record.         Outpatient Medications Marked as Taking for the 11/15/23 encounter (Office Visit) with [facility]ed[facility]   Medication Sig Dispense Refill    amphetamine-dextroamphetamine (ADDERALL) 20 MG tablet TAKE ONE TABLET BY MOUTH EVERY DAY FOR LUNCH      venlafaxine (EFFEXOR XR) 37.5 MG extended release capsule TAKE 1 CAPSULE BY MOUTH WITH FOOD ONCE A DAY      risperiDONE (RISPERDAL) 0.5 MG tablet Take 1 tablet by mouth 2 times daily      amphetamine-dextroamphetamine

## 2023-11-16 LAB
ALBUMIN SERPL-MCNC: 4.5 G/DL (ref 3.4–4.8)
ALBUMIN/GLOB SERPL: 1.7 {RATIO} (ref 0.8–2)
ALP SERPL-CCNC: 67 U/L (ref 25–100)
ALT SERPL-CCNC: 46 U/L (ref 4–36)
ANION GAP SERPL CALCULATED.3IONS-SCNC: 15 MMOL/L (ref 3–16)
AST SERPL-CCNC: 33 U/L (ref 8–33)
BILIRUB SERPL-MCNC: 0.4 MG/DL (ref 0.3–1.2)
BUN SERPL-MCNC: 10 MG/DL (ref 6–20)
CALCIUM SERPL-MCNC: 9.3 MG/DL (ref 8.5–10.5)
CHLORIDE SERPL-SCNC: 102 MMOL/L (ref 98–107)
CHOLEST SERPL-MCNC: 164 MG/DL (ref 0–200)
CO2 SERPL-SCNC: 21 MMOL/L (ref 20–30)
CREAT SERPL-MCNC: 0.9 MG/DL (ref 0.4–1.2)
GFR SERPLBLD CREATININE-BSD FMLA CKD-EPI: >60 ML/MIN/{1.73_M2}
GLOBULIN SER CALC-MCNC: 2.7 G/DL
GLUCOSE SERPL-MCNC: 82 MG/DL (ref 74–106)
HDLC SERPL-MCNC: 39 MG/DL (ref 40–60)
LDLC SERPL CALC-MCNC: 100 MG/DL
POTASSIUM SERPL-SCNC: 4 MMOL/L (ref 3.4–5.1)
PROT SERPL-MCNC: 7.2 G/DL (ref 6.4–8.3)
SODIUM SERPL-SCNC: 138 MMOL/L (ref 136–145)
TRIGL SERPL-MCNC: 124 MG/DL (ref 0–249)
TSH SERPL DL<=0.005 MIU/L-ACNC: 1.73 UIU/ML (ref 0.27–4.2)
VLDLC SERPL CALC-MCNC: 25 MG/DL

## 2023-11-17 LAB
25(OH)D3 SERPL-MCNC: 29.5 NG/ML
FOLATE SERPL-MCNC: 11.14 NG/ML (ref 4.78–24.2)
HAV AB SER QL IA: POSITIVE
VIT B12 SERPL-MCNC: 1434 PG/ML (ref 211–911)

## (undated) DEVICE — SUT SILK 3/0 SH CR8 18IN C013D

## (undated) DEVICE — TOWEL,OR,DSP,ST,NATURAL,DLX,4/PK,20PK/CS: Brand: MEDLINE

## (undated) DEVICE — TOWEL,OR,DSP,ST,WHITE,DLX,XR,2/PK,40PK/C: Brand: MEDLINE

## (undated) DEVICE — TUBING, SUCTION, 1/4" X 12', STRAIGHT: Brand: MEDLINE

## (undated) DEVICE — TP SXN YANKR BULB STRL

## (undated) DEVICE — GLV SURG SENSICARE W/ALOE PF LF 7.5 STRL

## (undated) DEVICE — SOL IRR NACL 0.9PCT BT 1000ML

## (undated) DEVICE — PROXIMATE RH ROTATING HEAD SKIN STAPLERS (35 WIDE) CONTAINS 35 STAINLESS STEEL STAPLES: Brand: PROXIMATE

## (undated) DEVICE — SUT VIC 0/0 UR6 27IN DYED J603H

## (undated) DEVICE — TBG PENCL TELESCP MEGADYNE SMOKE EVAC 10FT

## (undated) DEVICE — SUT PDS LP 1 TP1 96IN VIO PDP880GA

## (undated) DEVICE — MONOPOLAR METZENBAUM SCISSOR TIP, DISPOSABLE: Brand: MONOPOLAR METZENBAUM SCISSOR TIP, DISPOSABLE

## (undated) DEVICE — SUT SILK 0 TIES 30IN A306H

## (undated) DEVICE — HARMONIC 1100 SHEARS, 36CM SHAFT LENGTH: Brand: HARMONIC

## (undated) DEVICE — GLV SURG SENSICARE W/ALOE PF LF 6.5 STRL

## (undated) DEVICE — GOWN,PREVENTION PLUS,LARGE,STERILE: Brand: MEDLINE

## (undated) DEVICE — UNDYED MONOFILAMENT (POLYDIOXANONE), ABSORBABLE SYNTHETIC SURGICAL SUTURE: Brand: PDS

## (undated) DEVICE — SLV SCD CALF HEMOFORCE DVT THERP REPROC MD

## (undated) DEVICE — RICH GENERAL LAPAROSCOPY: Brand: MEDLINE INDUSTRIES, INC.

## (undated) DEVICE — ENDOPATH XCEL BLADELESS TROCARS WITH STABILITY SLEEVES: Brand: ENDOPATH XCEL

## (undated) DEVICE — GOWN,PREVENTION PLUS,XLARGE,STERILE: Brand: MEDLINE

## (undated) DEVICE — CVR HNDL LIGHT RIGID

## (undated) DEVICE — SHEET,DRAPE,70X100,STERILE: Brand: MEDLINE

## (undated) DEVICE — ENDOPATH XCEL UNIVERSAL TROCAR STABLILITY SLEEVES: Brand: ENDOPATH XCEL

## (undated) DEVICE — SPNG LAP 18X18IN LF STRL PK/5

## (undated) DEVICE — ACCESS PLATFORM FOR MINIMALLY INVASIVE SURGERY.: Brand: GELPORT® LAPAROSCOPIC  SYSTEM

## (undated) DEVICE — ADHS LIQ MASTISOL 2/3ML

## (undated) DEVICE — BLD CLIP UNIV SURG GRY

## (undated) DEVICE — ENDOPATH ETS-FLEX45 ARTICULATING ENDOSCOPIC LINEAR CUTTER, NO RELOAD: Brand: ENDOPATH

## (undated) DEVICE — MONOPOLAR METZENBAUM SCISSOR, MINI BLADE TIP, DISPOSABLE: Brand: MONOPOLAR METZENBAUM SCISSOR, MINI BLADE TIP, DISPOSABLE

## (undated) DEVICE — 2, DISPOSABLE SUCTION/IRRIGATOR WITHOUT DISPOSABLE TIP: Brand: STRYKEFLOW